# Patient Record
Sex: FEMALE | Race: WHITE | NOT HISPANIC OR LATINO | URBAN - METROPOLITAN AREA
[De-identification: names, ages, dates, MRNs, and addresses within clinical notes are randomized per-mention and may not be internally consistent; named-entity substitution may affect disease eponyms.]

---

## 2023-06-01 DIAGNOSIS — F32.A DEPRESSION, UNSPECIFIED DEPRESSION TYPE: Primary | ICD-10-CM

## 2023-06-01 NOTE — TELEPHONE ENCOUNTER
Patient is requesting a 90 day refill on her     Bupropion 300 mg    Sent to [facility] in [facility]    Thank You

## 2023-06-02 PROBLEM — F32.A DEPRESSION: Status: ACTIVE | Noted: 2023-06-02

## 2023-06-02 RX ORDER — BUPROPION HYDROCHLORIDE 300 MG/1
300 TABLET ORAL DAILY
COMMUNITY
End: 2023-06-02 | Stop reason: SDUPTHER

## 2023-06-02 RX ORDER — BUPROPION HYDROCHLORIDE 300 MG/1
300 TABLET ORAL DAILY
Qty: 30 TABLET | Refills: 0 | Status: SHIPPED | OUTPATIENT
Start: 2023-06-02 | End: 2023-06-21 | Stop reason: SDUPTHER

## 2023-06-02 NOTE — TELEPHONE ENCOUNTER
Requested Prescriptions     Pending Prescriptions Disp Refills    buPROPion XL (Wellbutrin XL) 300 mg 24 hr tablet 30 tablet 0     Sig: Take 1 tablet (300 mg) by mouth once daily.

## 2023-06-21 ENCOUNTER — OFFICE VISIT (OUTPATIENT)
Dept: PRIMARY CARE | Facility: CLINIC | Age: 68
End: 2023-06-21
Payer: MEDICARE

## 2023-06-21 VITALS
DIASTOLIC BLOOD PRESSURE: 105 MMHG | HEART RATE: 75 BPM | OXYGEN SATURATION: 90 % | BODY MASS INDEX: 39.65 KG/M2 | RESPIRATION RATE: 16 BRPM | WEIGHT: 231 LBS | SYSTOLIC BLOOD PRESSURE: 174 MMHG

## 2023-06-21 DIAGNOSIS — E66.9 CLASS 2 OBESITY WITHOUT SERIOUS COMORBIDITY WITH BODY MASS INDEX (BMI) OF 35.0 TO 35.9 IN ADULT, UNSPECIFIED OBESITY TYPE: ICD-10-CM

## 2023-06-21 DIAGNOSIS — E55.9 VITAMIN D DEFICIENCY: ICD-10-CM

## 2023-06-21 DIAGNOSIS — E53.8 VITAMIN B12 DEFICIENCY: ICD-10-CM

## 2023-06-21 DIAGNOSIS — F32.A DEPRESSION, UNSPECIFIED DEPRESSION TYPE: ICD-10-CM

## 2023-06-21 DIAGNOSIS — I10 HYPERTENSION, UNSPECIFIED TYPE: ICD-10-CM

## 2023-06-21 DIAGNOSIS — E78.5 HYPERLIPIDEMIA, UNSPECIFIED HYPERLIPIDEMIA TYPE: ICD-10-CM

## 2023-06-21 DIAGNOSIS — Z00.00 ROUTINE GENERAL MEDICAL EXAMINATION AT HEALTH CARE FACILITY: Primary | ICD-10-CM

## 2023-06-21 PROBLEM — E66.812 CLASS 2 OBESITY WITHOUT SERIOUS COMORBIDITY WITH BODY MASS INDEX (BMI) OF 35.0 TO 35.9 IN ADULT: Status: ACTIVE | Noted: 2023-06-21

## 2023-06-21 PROCEDURE — G0439 PPPS, SUBSEQ VISIT: HCPCS | Performed by: FAMILY MEDICINE

## 2023-06-21 RX ORDER — FUROSEMIDE 20 MG/1
20 TABLET ORAL DAILY
Qty: 90 TABLET | Refills: 1 | Status: SHIPPED | OUTPATIENT
Start: 2023-06-21 | End: 2023-12-18

## 2023-06-21 RX ORDER — TRIAMCINOLONE ACETONIDE 5 MG/G
CREAM TOPICAL 2 TIMES DAILY
COMMUNITY
Start: 2012-02-26

## 2023-06-21 RX ORDER — FUROSEMIDE 20 MG/1
20 TABLET ORAL DAILY
COMMUNITY
End: 2023-06-21 | Stop reason: SDUPTHER

## 2023-06-21 RX ORDER — METOPROLOL SUCCINATE 100 MG/1
100 TABLET, EXTENDED RELEASE ORAL DAILY
COMMUNITY
Start: 2023-03-16 | End: 2023-06-21 | Stop reason: SDUPTHER

## 2023-06-21 RX ORDER — PAROXETINE HYDROCHLORIDE 40 MG/1
40 TABLET, FILM COATED ORAL DAILY
COMMUNITY
End: 2023-06-21 | Stop reason: SDUPTHER

## 2023-06-21 RX ORDER — BUPROPION HYDROCHLORIDE 300 MG/1
300 TABLET ORAL DAILY
Qty: 90 TABLET | Refills: 1 | Status: SHIPPED | OUTPATIENT
Start: 2023-06-21 | End: 2023-06-30 | Stop reason: SDUPTHER

## 2023-06-21 RX ORDER — LISINOPRIL 40 MG/1
TABLET ORAL
COMMUNITY
Start: 2015-04-22 | End: 2023-06-21 | Stop reason: SDUPTHER

## 2023-06-21 RX ORDER — LISINOPRIL 40 MG/1
TABLET ORAL
Qty: 90 TABLET | Refills: 1 | Status: SHIPPED | OUTPATIENT
Start: 2023-06-21 | End: 2024-05-22 | Stop reason: SDUPTHER

## 2023-06-21 RX ORDER — PAROXETINE HYDROCHLORIDE 40 MG/1
40 TABLET, FILM COATED ORAL DAILY
Qty: 90 TABLET | Refills: 1 | Status: SHIPPED | OUTPATIENT
Start: 2023-06-21 | End: 2024-01-05 | Stop reason: SDUPTHER

## 2023-06-21 RX ORDER — METOPROLOL SUCCINATE 100 MG/1
100 TABLET, EXTENDED RELEASE ORAL DAILY
Qty: 90 TABLET | Refills: 1 | Status: SHIPPED | OUTPATIENT
Start: 2023-06-21 | End: 2024-01-05 | Stop reason: SDUPTHER

## 2023-06-21 ASSESSMENT — ENCOUNTER SYMPTOMS
LOSS OF SENSATION IN FEET: 0
DEPRESSION: 0
OCCASIONAL FEELINGS OF UNSTEADINESS: 0

## 2023-06-21 ASSESSMENT — PATIENT HEALTH QUESTIONNAIRE - PHQ9
2. FEELING DOWN, DEPRESSED OR HOPELESS: NOT AT ALL
1. LITTLE INTEREST OR PLEASURE IN DOING THINGS: NOT AT ALL
SUM OF ALL RESPONSES TO PHQ9 QUESTIONS 1 AND 2: 0
SUM OF ALL RESPONSES TO PHQ9 QUESTIONS 1 AND 2: 0
2. FEELING DOWN, DEPRESSED OR HOPELESS: NOT AT ALL
1. LITTLE INTEREST OR PLEASURE IN DOING THINGS: NOT AT ALL

## 2023-06-21 ASSESSMENT — ACTIVITIES OF DAILY LIVING (ADL)
TAKING_MEDICATION: INDEPENDENT
DRESSING: INDEPENDENT
MANAGING_FINANCES: INDEPENDENT
GROCERY_SHOPPING: INDEPENDENT
BATHING: INDEPENDENT
DOING_HOUSEWORK: INDEPENDENT

## 2023-06-21 NOTE — PROGRESS NOTES
Subjective   Reason for Visit: Ema Ulloa is an 68 y.o. female here for a Medicare Wellness visit.          HPI  Patient comes in today for Medicare wellness exam.  She is in town just a short period of time going back this Friday to [place], [place] to take care of her elderly mother who is ill.  She is currently without complaints.  She is taking all her medicines as directed without side effects.        12/15/2022  Patient is seen today as a telemedicine visit rendered via realtime interactive audio/video [employer] services as we are currently in the middle of a coronavirus pandemic worldwide. The patient was informed about the telehealth clinical encounter including benefits to avoiding travel and limitations to the assessment. In person care may be recommended if needed. Telehealth sessions are not being recorded and personal health information is protected.    Patient presents today from [facility] where she is about to have shoulder replacement surgery. By her history last year she has prediabetic. She is in need of refills of her medications. She has an appointment next month to see me in the office in person.    4/22/2022  Patient presents today for 6-month checkup and refill of meds. She states that she is taking her medicines as directed and is not having any side effects from them. She currently is complaining of pain in her left shoulder. She is also currently traveling back and forth between here in [place] taking care of her elderly parents.    9/29/2021  Patient is seen today as a telemedicine visit rendered via realtime interactive audio/video [employer] services as we are currently in the middle of a coronavirus pandemic worldwide. The patient was informed about the telehealth clinical encounter including benefits to avoiding travel and limitations to the assessment. In person care may be recommended if needed. Telehealth sessions are not being recorded and personal health  information is protected.    Patient presents today with symptoms of COVID-19. She went to [facility] last Sunday, 9/19/2021 and started coming down with symptoms on Wednesday and tested positive on Thursday, 9/23/2021. She has been vaccinated with the Pfizer vaccine. Since last week she has had significant uncontrollable diarrhea which has left her house a mess. She has no one to help her at home. She claims the diarrhea stopped a few days ago but she is requesting housekeeping help to clean up the diarrhea. She has no taste or smell but has a cough that lasts throughout the visit. She states the first 5 or 6 days she was running a fever anywhere from 99.4 to 102 degrees. She could not eat or drink. She states the last few days she has been drinking a lot of fluids. She states the worst symptom right now is the cough and lack of energy.     5/13/2021  Patient comes in today for Medicare wellness exam. She is retiring at the end of the month on 5/28/2021 and will be returning to [facility] to reside 3 out of the 4 weeks of each month to take care of her elderly mother who will be 91 this year. She is looking forward to it.    4/29/2021  Patient is seen today as a telemedicine visit rendered via realtime interactive audio/video [email or web address] services as we are currently in the middle of a coronavirus pandemic worldwide. The patient was informed about the telehealth clinical encounter including benefits to avoiding travel and limitations to the assessment. In person care may be recommended if needed. Telehealth sessions are not being recorded and personal health information is protected.    Patient presents today with upper respiratory symptoms. 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 She however has developed a cough again. She had one at the beginning of last month as well. She was treated with levofloxacin at that time and her cough to go away. She also is complaining of pressure  in her maxillary sinus region along with a headache. She has also had diarrhea for a week. She denies any other symptoms. She denies fever.    3/19/2021  Patient comes in today complaining of severe pain in her right shoulder. She was complaining of it during her last visit a week ago but it has gotten progressively worse. She tracks it back to about 7 to 10 days after getting the Covid vaccine in her right arm on 2/24/2021. She states that now it is so painful she has difficulty putting her bra on, wiping herself after having a bowel movement and other ADLs.     3/12/2021  Patient is seen today as a telemedicine visit rendered via realtime interactive audio/video [email or web address] services as we are currently in the middle of a coronavirus pandemic worldwide. The patient was informed about the telehealth clinical encounter including benefits to avoiding travel and limitations to the assessment. In person care may be recommended if needed. Telehealth sessions are not being recorded and personal health information is protected.    Patient presents today with a dry hacking cough and coughing spasms on and off for about a month now. She had the first dose of the Pfizer Covid vaccine on 2/24/2021 and is supposed to get the second 1 next Tuesday. She has had some body aches as well but she denies fever, sore throat, loss of smell or taste or GI symptoms.    Patient also complaining of some pain in her right shoulder. She claims her diabetes has been stable. She is overdue for lab work. Her leg has healed from her fracture last year.    1/26/2021  Patient comes in today for evaluation of UTI symptoms. She was having the symptoms which began Saturday night. She had an appointment with a teledoc who prescribed for her Septra DS for 5 days. She went to get the prescription that evening and the pharmacy was closed until 11 AM the next day. That night she had emesis all night long along with uncontrollable urination. Earlier in the week  she had diarrhea but that had gone away. She got the prescription after 11 AM on Sunday and got doses in twice a day since then. She is feeling a little bit better today. She has been slightly dizzy as well. 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 She is thinking about retiring soon.    11/19/2020  Patient presents today with a coarse productive cough. She denies fever or chills. She has had some dizziness. She had been working virtually from home until last week. Last week she was told to return to the school building and she was there the ninth through the 13th all week. Apparently some coworkers tested positive. She had a Covid test this past Sunday and got the results yesterday, it was negative.     7/17/2020  Patient presents today to get a note for her work place. 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 She tells me that work is available for her to do so. I explained they current  policy regarding work in the coronavirus pandemic.    7/7/2020  A telephone visit (audio only) between the patient (at the originating site) and the provider (at the distant site) was utilized to provide this telehealth service as we are currently under a coronavirus pandemic worldwide with stay-at-home orders by the government. Patient does not have video availability with phone or email.    Patient presents today for follow-up for left proximal tibia and medial femoral condyle fractures that occurred on 6/9/2020. She was visiting her parents in [place], [place] and was doing some yard work for them when she slipped on ivy and fell on concrete. She went to [facility] as they were right around the corner from where her parents live. He has had x-rays and an MRI of the left knee and has limited ambulation using a walker or  "cony. She has been in a knee immobilizer. She just got back into town a few days ago and was told to see orthopedics as soon as possible for follow-up. Will need to get her records from [place].    3/18/2020  Patient comes in today for follow-up on her cough. She completed her antibiotics last Friday however her cough persists and she is running a fever of 99.4°. As noted previously she does work in the school system which is currently shut down because of the coronavirus.    3/3/2020  Patient comes in today complaining of upper respiratory symptoms that began over the weekend. She has had a cough that is both dry and productive. She works in the school system and her school which shot down last week for 2 days to clean because of the number of absences. She is also complaining today of ear pain and sore throat. She is running a low-grade fever of 99°. She denies GI symptoms and did not get a flu shot this year.     8/29/19  Patient comes in today complaining of urinary tract symptoms. She is having urinary frequency, urgency and burning. She denies flank pain or other symptoms. Her UA today is completely negative except for small amount of protein.    7/15/2019   The patient is a 64 year old female who presents for a Recheck of Transition into care. The patient is transitioning into care from another physician (Pt is here as new pt to be established and for a refill of meds.) and a summary of care was not provided. Note for \"Transition into care\": Patient comes in today for her second visit to the practice. She returns for a physical exam.    She lives in [place]. She is originally from [place], [place]. Her mom and dad both 89 years old, alive and well still reside in [place]. She has one brother and 2 sisters. She is the oldest. She worked for the [employer] as a young adult and was transferred to the [place] area many years ago and has resided here since. She is currently working in the " school system as an . Her health insurance has changed a number of times over the years and seems to go back and forth between [facility] and . It just switched to .    Patient claims she has been pretty good health. She did break her left shoulder around 8 years ago and did not have to have surgery. It seemed to heal and did okay until now it's bothering her. It it is still bothering her today.    Patient has a flat erythematous skin lesion with an irregular border on the left upper biceps region which she states has been treated with a number of creams over the years without improvement. The lesion does seem to be improving with the cream I gave her last visit.    Patient had an exophytic lesion on the tip of her tongue that she had noticed for about 3 or 4 months. She denies trauma or injury to the tongue. It was removed recently by Dr. Gillette. We are awaiting pathology.    Patient has been battling her weight and would like to pursue something to help her lose weight.    Patient is on antidepressant medicine which she has been on for 25 years and also blood pressure medicine which she is been on for at least 20 years.    Patient Care Team:  Jose Nash DO as PCP - General  Jose Nash DO as PCP - [employer] PCP     Review of Systems   All other systems reviewed and are negative.      Objective   Vitals:  BP (!) 174/105   Pulse 75   Resp 16   Wt 105 kg (231 lb)   LMP  (LMP Unknown)   SpO2 90%   BMI 39.65 kg/m²       Physical Exam  Vitals reviewed.   Constitutional:       Appearance: She is well-developed. She is obese.   HENT:      Head: Normocephalic and atraumatic.      Right Ear: Tympanic membrane, ear canal and external ear normal.      Left Ear: Tympanic membrane, ear canal and external ear normal.      Nose: Nose normal.      Mouth/Throat:      Mouth: Mucous membranes are moist.      Pharynx: Oropharynx is clear.   Eyes:      Extraocular Movements:  Extraocular movements intact.      Conjunctiva/sclera: Conjunctivae normal.      Pupils: Pupils are equal, round, and reactive to light.   Cardiovascular:      Rate and Rhythm: Normal rate and regular rhythm.      Heart sounds: Normal heart sounds. No murmur heard.  Pulmonary:      Effort: Pulmonary effort is normal.      Breath sounds: Normal breath sounds. No wheezing.   Abdominal:      General: Abdomen is flat. Bowel sounds are normal.      Palpations: Abdomen is soft.   Musculoskeletal:         General: Normal range of motion.   Skin:     General: Skin is warm and dry.   Neurological:      General: No focal deficit present.      Mental Status: She is alert and oriented to person, place, and time. Mental status is at baseline.   Psychiatric:         Mood and Affect: Mood normal.         Behavior: Behavior normal.         Thought Content: Thought content normal.         Judgment: Judgment normal.         Assessment/Plan   Problem List Items Addressed This Visit       Depression    Relevant Medications    buPROPion XL (Wellbutrin XL) 300 mg 24 hr tablet    PARoxetine (Paxil) 40 mg tablet    Hypertension    Relevant Medications    metoprolol succinate XL (Toprol-XL) 100 mg 24 hr tablet    furosemide (Lasix) 20 mg tablet    lisinopril 40 mg tablet    Hyperlipidemia    Relevant Orders    Lipid Panel (Completed)    Vitamin D deficiency    Relevant Orders    Vitamin D, Total (Completed)    Vitamin B12 deficiency    Relevant Orders    CBC (Completed)    Vitamin B12 (Completed)    Class 2 obesity without serious comorbidity with body mass index (BMI) of 35.0 to 35.9 in adult    Relevant Medications    semaglutide, weight loss, 0.5 mg/0.5 mL pen injector    Routine general medical examination at health care facility - Primary    Relevant Orders    Comprehensive Metabolic Panel (Completed)   Will contact patient with lab results when available.  Continue current meds as directed.  Follow up in 6 months for recheck if all  remains stable, sooner if problems arise.  Medication list reconciled.  Thank you for visiting today!      Professional services: Some of this note was completed using Dragon voice technology and sometimes the software misinterprets words. This may include unintended errors with respect to translation of words, typographical errors or grammar errors which may not have been identified prior to finalization of the chart note. Please take this into account when reading the note. Thank you.

## 2023-06-21 NOTE — PROGRESS NOTES
Subjective   Reason for Visit: Eam Ulloa is an 68 y.o. female here for a Medicare Wellness visit.          Reviewed all medications by prescribing practitioner or clinical pharmacist (such as prescriptions, OTCs, herbal therapies and supplements) and documented in the medical record.    HPI    Patient Care Team:  Jose Nash DO as PCP - General  Jose Nash DO as PCP - [employer] PCP     Review of Systems    Objective   Vitals:  BP (!) 174/105   Pulse 75   Resp 16   Wt 105 kg (231 lb)   LMP  (LMP Unknown)   SpO2 90%   BMI 39.65 kg/m²       Physical Exam    Assessment/Plan   Problem List Items Addressed This Visit    None

## 2023-06-22 ENCOUNTER — LAB (OUTPATIENT)
Dept: LAB | Facility: LAB | Age: 68
End: 2023-06-22
Payer: MEDICARE

## 2023-06-22 DIAGNOSIS — E78.5 HYPERLIPIDEMIA, UNSPECIFIED HYPERLIPIDEMIA TYPE: ICD-10-CM

## 2023-06-22 DIAGNOSIS — Z00.00 ROUTINE GENERAL MEDICAL EXAMINATION AT HEALTH CARE FACILITY: ICD-10-CM

## 2023-06-22 DIAGNOSIS — E55.9 VITAMIN D DEFICIENCY: ICD-10-CM

## 2023-06-22 DIAGNOSIS — E53.8 VITAMIN B12 DEFICIENCY: ICD-10-CM

## 2023-06-22 LAB
ALANINE AMINOTRANSFERASE (SGPT) (U/L) IN SER/PLAS: 13 U/L (ref 7–45)
ALBUMIN (G/DL) IN SER/PLAS: 4 G/DL (ref 3.4–5)
ALKALINE PHOSPHATASE (U/L) IN SER/PLAS: 97 U/L (ref 33–136)
ANION GAP IN SER/PLAS: 13 MMOL/L (ref 10–20)
ASPARTATE AMINOTRANSFERASE (SGOT) (U/L) IN SER/PLAS: 15 U/L (ref 9–39)
BILIRUBIN TOTAL (MG/DL) IN SER/PLAS: 0.4 MG/DL (ref 0–1.2)
CALCIDIOL (25 OH VITAMIN D3) (NG/ML) IN SER/PLAS: 14 NG/ML
CALCIUM (MG/DL) IN SER/PLAS: 9.8 MG/DL (ref 8.6–10.3)
CARBON DIOXIDE, TOTAL (MMOL/L) IN SER/PLAS: 28 MMOL/L (ref 21–32)
CHLORIDE (MMOL/L) IN SER/PLAS: 106 MMOL/L (ref 98–107)
CHOLESTEROL (MG/DL) IN SER/PLAS: 206 MG/DL (ref 0–199)
CHOLESTEROL IN HDL (MG/DL) IN SER/PLAS: 49.4 MG/DL
CHOLESTEROL/HDL RATIO: 4.2
COBALAMIN (VITAMIN B12) (PG/ML) IN SER/PLAS: 273 PG/ML (ref 211–911)
CREATININE (MG/DL) IN SER/PLAS: 1.07 MG/DL (ref 0.5–1.05)
ERYTHROCYTE DISTRIBUTION WIDTH (RATIO) BY AUTOMATED COUNT: 12.6 % (ref 11.5–14.5)
ERYTHROCYTE MEAN CORPUSCULAR HEMOGLOBIN CONCENTRATION (G/DL) BY AUTOMATED: 32.7 G/DL (ref 32–36)
ERYTHROCYTE MEAN CORPUSCULAR VOLUME (FL) BY AUTOMATED COUNT: 92 FL (ref 80–100)
ERYTHROCYTES (10*6/UL) IN BLOOD BY AUTOMATED COUNT: 5.04 X10E12/L (ref 4–5.2)
GFR FEMALE: 56 ML/MIN/1.73M2
GLUCOSE (MG/DL) IN SER/PLAS: 123 MG/DL (ref 74–99)
HEMATOCRIT (%) IN BLOOD BY AUTOMATED COUNT: 46.5 % (ref 36–46)
HEMOGLOBIN (G/DL) IN BLOOD: 15.2 G/DL (ref 12–16)
LDL: 133 MG/DL (ref 0–99)
LEUKOCYTES (10*3/UL) IN BLOOD BY AUTOMATED COUNT: 11.1 X10E9/L (ref 4.4–11.3)
PLATELETS (10*3/UL) IN BLOOD AUTOMATED COUNT: 373 X10E9/L (ref 150–450)
POTASSIUM (MMOL/L) IN SER/PLAS: 4.9 MMOL/L (ref 3.5–5.3)
PROTEIN TOTAL: 6.3 G/DL (ref 6.4–8.2)
SODIUM (MMOL/L) IN SER/PLAS: 142 MMOL/L (ref 136–145)
TRIGLYCERIDE (MG/DL) IN SER/PLAS: 119 MG/DL (ref 0–149)
UREA NITROGEN (MG/DL) IN SER/PLAS: 31 MG/DL (ref 6–23)
VLDL: 24 MG/DL (ref 0–40)

## 2023-06-22 PROCEDURE — 85027 COMPLETE CBC AUTOMATED: CPT

## 2023-06-22 PROCEDURE — 36415 COLL VENOUS BLD VENIPUNCTURE: CPT

## 2023-06-22 PROCEDURE — 80061 LIPID PANEL: CPT

## 2023-06-22 PROCEDURE — 82607 VITAMIN B-12: CPT

## 2023-06-22 PROCEDURE — 82306 VITAMIN D 25 HYDROXY: CPT

## 2023-06-22 PROCEDURE — 83036 HEMOGLOBIN GLYCOSYLATED A1C: CPT

## 2023-06-22 PROCEDURE — 80053 COMPREHEN METABOLIC PANEL: CPT

## 2023-06-23 ENCOUNTER — TELEPHONE (OUTPATIENT)
Dept: PRIMARY CARE | Facility: CLINIC | Age: 68
End: 2023-06-23
Payer: MEDICARE

## 2023-06-23 DIAGNOSIS — R73.03 PREDIABETES: Primary | ICD-10-CM

## 2023-06-23 LAB
ESTIMATED AVERAGE GLUCOSE FOR HBA1C: 128 MG/DL
HEMOGLOBIN A1C/HEMOGLOBIN TOTAL IN BLOOD: 6.1 %

## 2023-06-23 NOTE — TELEPHONE ENCOUNTER
Patient would like to go over the results of her lab work with the doctor.  Patient can be reached at [phone].    Thank You

## 2023-06-28 DIAGNOSIS — R73.03 PREDIABETES: Primary | ICD-10-CM

## 2023-06-28 PROBLEM — R73.9 HYPERGLYCEMIA: Status: ACTIVE | Noted: 2023-06-28

## 2023-06-28 NOTE — TELEPHONE ENCOUNTER
Pt given labs. Wants to know if she can try ozempic since she got denied for wegovy. Please advise.

## 2023-06-30 DIAGNOSIS — E55.9 VITAMIN D DEFICIENCY: Primary | ICD-10-CM

## 2023-06-30 DIAGNOSIS — F32.A DEPRESSION, UNSPECIFIED DEPRESSION TYPE: ICD-10-CM

## 2023-06-30 RX ORDER — ERGOCALCIFEROL 1.25 MG/1
50000 CAPSULE ORAL
COMMUNITY
Start: 2019-07-21

## 2023-06-30 RX ORDER — BUPROPION HYDROCHLORIDE 300 MG/1
300 TABLET ORAL DAILY
Qty: 90 TABLET | Refills: 1 | Status: SHIPPED | OUTPATIENT
Start: 2023-06-30 | End: 2024-01-05 | Stop reason: SDUPTHER

## 2023-06-30 RX ORDER — ERGOCALCIFEROL 1.25 MG/1
50000 CAPSULE ORAL
Qty: 4 CAPSULE | Refills: 0 | Status: CANCELLED | OUTPATIENT
Start: 2023-06-30 | End: 2023-07-30

## 2023-06-30 RX ORDER — ERGOCALCIFEROL 1.25 MG/1
CAPSULE ORAL
Qty: 16 CAPSULE | Refills: 2 | Status: SHIPPED | OUTPATIENT
Start: 2023-06-30

## 2023-06-30 NOTE — TELEPHONE ENCOUNTER
Requested Prescriptions     Pending Prescriptions Disp Refills    buPROPion XL (Wellbutrin XL) 300 mg 24 hr tablet 90 tablet 1     Sig: Take 1 tablet (300 mg) by mouth once daily.    ergocalciferol (Vitamin D-2) 1.25 MG (61340 UT) capsule 4 capsule 0     Sig: Take 1 capsule (50,000 Units) by mouth 1 (one) time per week.

## 2023-06-30 NOTE — TELEPHONE ENCOUNTER
Patient is stating that the [facility] in [place] did not receive the prescription for her Wellbutrin or the Vitamin D.  If it can please be resent to the [facility] for her.  Patient can be reached at [phone].    Thank You

## 2023-07-21 ENCOUNTER — PATIENT OUTREACH (OUTPATIENT)
Dept: CARE COORDINATION | Facility: CLINIC | Age: 68
End: 2023-07-21
Payer: MEDICARE

## 2023-07-21 NOTE — PROGRESS NOTES
Left message on patient's phone with these details.    Marcus, My name is Cintia. I am the Patient Navigator with [facility].    I am following up from your recent visit with your PCP to make sure you do not have any further questions or need any further assistance.     I am here to help guide you through our healthcare system and help with any obstacles that are in the way of you receiving the proper care. I am able to assist with your appointments, medication coverage issues, community programs which can include help with meals, medical supplies, senior programs, housing and transportation issues.     We are here to help get you connected with the support you might need for your overall health. If you do need my assistance or have any questions please contact me at [phone]. This is my direct number and you can feel free to leave a message if I do not answer and I will call you back at my earliest convenience.     Contacting patient to Review [employer] Patient Experience Questionnaire.

## 2024-01-05 DIAGNOSIS — I10 HYPERTENSION, UNSPECIFIED TYPE: ICD-10-CM

## 2024-01-05 DIAGNOSIS — F32.A DEPRESSION, UNSPECIFIED DEPRESSION TYPE: ICD-10-CM

## 2024-01-05 RX ORDER — METOPROLOL SUCCINATE 100 MG/1
100 TABLET, EXTENDED RELEASE ORAL DAILY
Qty: 30 TABLET | Refills: 0 | Status: SHIPPED | OUTPATIENT
Start: 2024-01-05 | End: 2024-05-22 | Stop reason: SDUPTHER

## 2024-01-05 RX ORDER — PAROXETINE HYDROCHLORIDE 40 MG/1
40 TABLET, FILM COATED ORAL DAILY
Qty: 30 TABLET | Refills: 0 | Status: SHIPPED | OUTPATIENT
Start: 2024-01-05 | End: 2024-05-22 | Stop reason: SDUPTHER

## 2024-01-05 RX ORDER — BUPROPION HYDROCHLORIDE 300 MG/1
300 TABLET ORAL DAILY
Qty: 30 TABLET | Refills: 0 | Status: SHIPPED | OUTPATIENT
Start: 2024-01-05 | End: 2024-05-22 | Stop reason: SDUPTHER

## 2024-01-05 NOTE — TELEPHONE ENCOUNTER
Requested Prescriptions     Pending Prescriptions Disp Refills    buPROPion XL (Wellbutrin XL) 300 mg 24 hr tablet 30 tablet 0     Sig: Take 1 tablet (300 mg) by mouth once daily.    PARoxetine (Paxil) 40 mg tablet 30 tablet 0     Sig: Take 1 tablet (40 mg) by mouth once daily.    metoprolol succinate XL (Toprol-XL) 100 mg 24 hr tablet 30 tablet 0     Sig: Take 1 tablet (100 mg) by mouth once daily.     Please review and advise. Pt [place] her [place] out of state.

## 2024-01-05 NOTE — TELEPHONE ENCOUNTER
Patient called in requesting a refill on her       Wellbutrin 300 mg  Paxil 40 mg  Metoprolol 100 mg      Sent to [facility] in [place]      Thank You         Patient has an appointment on 1/10/24 with the doctor but she forgot her medication when she left [place] so she is completely out.  Patient can be reached at [phone].

## 2024-01-10 ENCOUNTER — LAB (OUTPATIENT)
Dept: LAB | Facility: LAB | Age: 69
End: 2024-01-10
Payer: MEDICARE

## 2024-01-10 ENCOUNTER — OFFICE VISIT (OUTPATIENT)
Dept: PRIMARY CARE | Facility: CLINIC | Age: 69
End: 2024-01-10
Payer: MEDICARE

## 2024-01-10 ENCOUNTER — TELEPHONE (OUTPATIENT)
Dept: PRIMARY CARE | Facility: CLINIC | Age: 69
End: 2024-01-10

## 2024-01-10 VITALS
DIASTOLIC BLOOD PRESSURE: 107 MMHG | HEART RATE: 65 BPM | BODY MASS INDEX: 40.63 KG/M2 | WEIGHT: 238 LBS | HEIGHT: 64 IN | RESPIRATION RATE: 20 BRPM | TEMPERATURE: 97.8 F | OXYGEN SATURATION: 91 % | SYSTOLIC BLOOD PRESSURE: 196 MMHG

## 2024-01-10 DIAGNOSIS — E53.8 VITAMIN B12 DEFICIENCY: ICD-10-CM

## 2024-01-10 DIAGNOSIS — Z01.818 PREOPERATIVE CLEARANCE: ICD-10-CM

## 2024-01-10 DIAGNOSIS — R73.03 PREDIABETES: ICD-10-CM

## 2024-01-10 DIAGNOSIS — E66.01 MORBID OBESITY WITH BMI OF 40.0-44.9, ADULT (MULTI): ICD-10-CM

## 2024-01-10 DIAGNOSIS — I10 BENIGN ESSENTIAL HYPERTENSION: ICD-10-CM

## 2024-01-10 DIAGNOSIS — R79.1 ABNORMAL COAGULATION PROFILE: ICD-10-CM

## 2024-01-10 DIAGNOSIS — E78.5 HYPERLIPIDEMIA, UNSPECIFIED HYPERLIPIDEMIA TYPE: ICD-10-CM

## 2024-01-10 DIAGNOSIS — E55.9 VITAMIN D DEFICIENCY: ICD-10-CM

## 2024-01-10 DIAGNOSIS — I10 HYPERTENSION, UNSPECIFIED TYPE: ICD-10-CM

## 2024-01-10 DIAGNOSIS — M25.512 CHRONIC LEFT SHOULDER PAIN: Primary | ICD-10-CM

## 2024-01-10 DIAGNOSIS — Z00.00 ENCOUNTER FOR HEALTH MAINTENANCE EXAMINATION: ICD-10-CM

## 2024-01-10 DIAGNOSIS — N18.31 STAGE 3A CHRONIC KIDNEY DISEASE (MULTI): ICD-10-CM

## 2024-01-10 DIAGNOSIS — G89.29 CHRONIC LEFT SHOULDER PAIN: Primary | ICD-10-CM

## 2024-01-10 LAB
25(OH)D3 SERPL-MCNC: 18 NG/ML (ref 30–100)
ANION GAP SERPL CALC-SCNC: 13 MMOL/L (ref 10–20)
APTT PPP: 34 SECONDS (ref 27–38)
BASOPHILS # BLD AUTO: 0.09 X10*3/UL (ref 0–0.1)
BASOPHILS NFR BLD AUTO: 0.8 %
BUN SERPL-MCNC: 28 MG/DL (ref 6–23)
CALCIUM SERPL-MCNC: 9.9 MG/DL (ref 8.6–10.3)
CHLORIDE SERPL-SCNC: 108 MMOL/L (ref 98–107)
CO2 SERPL-SCNC: 26 MMOL/L (ref 21–32)
CREAT SERPL-MCNC: 1.04 MG/DL (ref 0.5–1.05)
EGFRCR SERPLBLD CKD-EPI 2021: 58 ML/MIN/1.73M*2
EOSINOPHIL # BLD AUTO: 0.44 X10*3/UL (ref 0–0.7)
EOSINOPHIL NFR BLD AUTO: 3.8 %
ERYTHROCYTE [DISTWIDTH] IN BLOOD BY AUTOMATED COUNT: 13.1 % (ref 11.5–14.5)
EST. AVERAGE GLUCOSE BLD GHB EST-MCNC: 134 MG/DL
GLUCOSE SERPL-MCNC: 172 MG/DL (ref 74–99)
HBA1C MFR BLD: 6.3 %
HCT VFR BLD AUTO: 47 % (ref 36–46)
HGB BLD-MCNC: 15.2 G/DL (ref 12–16)
IMM GRANULOCYTES # BLD AUTO: 0.04 X10*3/UL (ref 0–0.7)
IMM GRANULOCYTES NFR BLD AUTO: 0.3 % (ref 0–0.9)
INR PPP: 1 (ref 0.9–1.1)
LYMPHOCYTES # BLD AUTO: 2.5 X10*3/UL (ref 1.2–4.8)
LYMPHOCYTES NFR BLD AUTO: 21.8 %
MCH RBC QN AUTO: 30.5 PG (ref 26–34)
MCHC RBC AUTO-ENTMCNC: 32.3 G/DL (ref 32–36)
MCV RBC AUTO: 94 FL (ref 80–100)
MONOCYTES # BLD AUTO: 0.62 X10*3/UL (ref 0.1–1)
MONOCYTES NFR BLD AUTO: 5.4 %
NEUTROPHILS # BLD AUTO: 7.78 X10*3/UL (ref 1.2–7.7)
NEUTROPHILS NFR BLD AUTO: 67.9 %
NRBC BLD-RTO: 0 /100 WBCS (ref 0–0)
PLATELET # BLD AUTO: 398 X10*3/UL (ref 150–450)
POC APPEARANCE, URINE: ABNORMAL
POC BILIRUBIN, URINE: NEGATIVE
POC BLOOD, URINE: NEGATIVE
POC COLOR, URINE: YELLOW
POC GLUCOSE, URINE: NEGATIVE MG/DL
POC KETONES, URINE: NEGATIVE MG/DL
POC LEUKOCYTES, URINE: NEGATIVE
POC NITRITE,URINE: NEGATIVE
POC PH, URINE: 5.5 PH
POC PROTEIN, URINE: ABNORMAL MG/DL
POC SPECIFIC GRAVITY, URINE: >=1.03
POC UROBILINOGEN, URINE: 0.2 EU/DL
POTASSIUM SERPL-SCNC: 4.7 MMOL/L (ref 3.5–5.3)
PROTHROMBIN TIME: 10.8 SECONDS (ref 9.8–12.8)
RBC # BLD AUTO: 4.99 X10*6/UL (ref 4–5.2)
SODIUM SERPL-SCNC: 142 MMOL/L (ref 136–145)
VIT B12 SERPL-MCNC: 252 PG/ML (ref 211–911)
WBC # BLD AUTO: 11.5 X10*3/UL (ref 4.4–11.3)

## 2024-01-10 PROCEDURE — 93000 ELECTROCARDIOGRAM COMPLETE: CPT | Performed by: FAMILY MEDICINE

## 2024-01-10 PROCEDURE — 85730 THROMBOPLASTIN TIME PARTIAL: CPT

## 2024-01-10 PROCEDURE — 36415 COLL VENOUS BLD VENIPUNCTURE: CPT

## 2024-01-10 PROCEDURE — 99397 PER PM REEVAL EST PAT 65+ YR: CPT | Performed by: FAMILY MEDICINE

## 2024-01-10 PROCEDURE — 83036 HEMOGLOBIN GLYCOSYLATED A1C: CPT

## 2024-01-10 PROCEDURE — 82306 VITAMIN D 25 HYDROXY: CPT

## 2024-01-10 PROCEDURE — 81003 URINALYSIS AUTO W/O SCOPE: CPT | Performed by: FAMILY MEDICINE

## 2024-01-10 PROCEDURE — 85025 COMPLETE CBC W/AUTO DIFF WBC: CPT

## 2024-01-10 PROCEDURE — 85610 PROTHROMBIN TIME: CPT

## 2024-01-10 PROCEDURE — 82607 VITAMIN B-12: CPT

## 2024-01-10 PROCEDURE — 80048 BASIC METABOLIC PNL TOTAL CA: CPT

## 2024-01-10 NOTE — TELEPHONE ENCOUNTER
Addressed with Pt. She mentioned she did not get her imaging done bc it was overcrowded in the department, she will go closer to her home.

## 2024-01-10 NOTE — TELEPHONE ENCOUNTER
Pt called upset because she stated that when she got her paperwork for when she left it stated her weight was 248, she said this was wrong and she looked at it when it was taken and it was at least 10 pounds over what she saw.    She would like a call to discuss this asap.

## 2024-01-10 NOTE — PROGRESS NOTES
"Subjective   Patient ID: Ema Ulloa is a 68 y.o. female who presents for Pre-op Clearance (L arm for arthirits pain. Getting this done in [place] and says the paperwork was faxed over about a week ago. ).    HPI   Patient comes in today for surgical clearance for upcoming left reverse total shoulder arthroplasty which is going to be done in [facility].  She needs preop labs, chest x-ray and EKG.  She is otherwise currently without complaints.    Review of Systems   All other systems reviewed and are negative.      Objective   BP (!) 196/107   Pulse 65   Temp 36.6 °C (97.8 °F)   Resp 20   Ht 1.626 m (5' 4\")   Wt 108 kg (238 lb)   LMP  (LMP Unknown)   SpO2 91%   BMI 40.85 kg/m²     Physical Exam  Constitutional:       Appearance: She is well-developed. She is morbidly obese.   HENT:      Head: Normocephalic and atraumatic.      Right Ear: Tympanic membrane, ear canal and external ear normal.      Left Ear: Tympanic membrane, ear canal and external ear normal.      Nose: Nose normal.      Mouth/Throat:      Mouth: Mucous membranes are moist.      Pharynx: Oropharynx is clear.   Eyes:      Extraocular Movements: Extraocular movements intact.      Conjunctiva/sclera: Conjunctivae normal.      Pupils: Pupils are equal, round, and reactive to light.   Cardiovascular:      Rate and Rhythm: Normal rate and regular rhythm.      Heart sounds: Normal heart sounds. No murmur heard.  Pulmonary:      Effort: Pulmonary effort is normal.      Breath sounds: Normal breath sounds. No wheezing.   Abdominal:      General: Abdomen is flat. Bowel sounds are normal.      Palpations: Abdomen is soft.   Musculoskeletal:         General: Tenderness (Left shoulder pain with decreased range of motion in abduction and internal and external rotation) present.   Skin:     General: Skin is warm and dry.   Neurological:      General: No focal deficit present.      Mental Status: She is alert and oriented to person, place, and " time. Mental status is at baseline.   Psychiatric:         Mood and Affect: Mood normal.         Behavior: Behavior normal.         Thought Content: Thought content normal.         Judgment: Judgment normal.         Assessment/Plan   Problem List Items Addressed This Visit             ICD-10-CM    Hypertension I10    Relevant Orders    Protime-INR (Completed)    APTT (Completed)    Hyperlipidemia E78.5    Vitamin D deficiency E55.9    Relevant Orders    Vitamin D 25-Hydroxy,Total (for eval of Vitamin D levels) (Completed)    Vitamin B12 deficiency E53.8    Relevant Orders    Vitamin B12 (Completed)    Stage 3a chronic kidney disease (CMS/[facility]) N18.31     Other Visit Diagnoses         Codes    Chronic left shoulder pain    -  Primary M25.512, G89.29    Morbid obesity with BMI of 40.0-44.9, adult (CMS/[facility])     E66.01, Z68.41    Encounter for health maintenance examination     Z00.00    Relevant Orders    ECG 12 Lead (Completed)    Benign essential hypertension     I10    Relevant Orders    ECG 12 Lead (Completed)    Preoperative clearance     Z01.818    Relevant Orders    XR chest 2 views    CBC and Auto Differential (Completed)    Basic Metabolic Panel (Completed)    POCT UA Automated manually resulted (Completed)    Abnormal coagulation profile     R79.1    Relevant Orders    Protime-INR (Completed)    APTT (Completed)        Will contact patient with lab results when available.  Will contact patient with x-ray results when they are available.  Patient will be cleared for surgery when all test results are back.  Medication list reconciled.  Thank you for visiting today!      Professional services: Some of this note was completed using Dragon voice technology and sometimes the software misinterprets words. This may include unintended errors with respect to translation of words, typographical errors or grammar errors which may not have been identified prior to finalization of the chart note. Please take this into  account when reading the note. Thank you.

## 2024-01-11 PROBLEM — N18.31 STAGE 3A CHRONIC KIDNEY DISEASE (MULTI): Status: ACTIVE | Noted: 2024-01-11

## 2024-01-12 DIAGNOSIS — E53.8 VITAMIN B12 DEFICIENCY: ICD-10-CM

## 2024-01-12 DIAGNOSIS — E55.9 VITAMIN D DEFICIENCY: Primary | ICD-10-CM

## 2024-01-12 RX ORDER — CHOLECALCIFEROL (VITAMIN D3) 125 MCG
CAPSULE ORAL
Qty: 100 TABLET | Refills: 1 | Status: SHIPPED | OUTPATIENT
Start: 2024-01-12

## 2024-01-12 RX ORDER — ERGOCALCIFEROL 1.25 MG/1
CAPSULE ORAL
Qty: 12 CAPSULE | Refills: 2 | Status: SHIPPED | OUTPATIENT
Start: 2024-01-12

## 2024-05-21 DIAGNOSIS — I10 HYPERTENSION, UNSPECIFIED TYPE: ICD-10-CM

## 2024-05-21 DIAGNOSIS — F32.A DEPRESSION, UNSPECIFIED DEPRESSION TYPE: ICD-10-CM

## 2024-05-21 NOTE — TELEPHONE ENCOUNTER
Rx Refill Request Telephone Encounter    Name:  Ema Ulloa  :  [id number]  Medication Name:    buPROPion XL (Wellbutrin XL) 300 mg 24 hr tablet   lisinopril 40 mg tablet   metoprolol succinate XL (Toprol-XL) 100 mg 24 hr tablet   PARoxetine (Paxil) 40 mg tablet       Specific Pharmacy location:    [id number] [address] AT [facility] [address] Phone: [phone]   Fax: [phone]        Date of last appointment:  01/10/24  Date of next appointment:  24  Best number to reach patient:  [phone]        Thank You

## 2024-05-22 NOTE — TELEPHONE ENCOUNTER
Requested Prescriptions     Pending Prescriptions Disp Refills    buPROPion XL (Wellbutrin XL) 300 mg 24 hr tablet 30 tablet 0     Sig: Take 1 tablet (300 mg) by mouth once daily.    metoprolol succinate XL (Toprol-XL) 100 mg 24 hr tablet 30 tablet 0     Sig: Take 1 tablet (100 mg) by mouth once daily.    PARoxetine (Paxil) 40 mg tablet 30 tablet 0     Sig: Take 1 tablet (40 mg) by mouth once daily.    lisinopril 40 mg tablet 30 tablet 0     Sig: TAKE 1 TABLET BY MOUTH ONE TIME DAILY

## 2024-05-28 RX ORDER — PAROXETINE HYDROCHLORIDE 40 MG/1
40 TABLET, FILM COATED ORAL DAILY
Qty: 30 TABLET | Refills: 3 | Status: SHIPPED | OUTPATIENT
Start: 2024-05-28 | End: 2024-09-25

## 2024-05-28 RX ORDER — BUPROPION HYDROCHLORIDE 300 MG/1
300 TABLET ORAL DAILY
Qty: 30 TABLET | Refills: 3 | Status: SHIPPED | OUTPATIENT
Start: 2024-05-28 | End: 2024-09-25

## 2024-05-28 RX ORDER — METOPROLOL SUCCINATE 100 MG/1
100 TABLET, EXTENDED RELEASE ORAL DAILY
Qty: 30 TABLET | Refills: 3 | Status: SHIPPED | OUTPATIENT
Start: 2024-05-28 | End: 2024-09-25

## 2024-05-28 RX ORDER — LISINOPRIL 40 MG/1
TABLET ORAL
Qty: 30 TABLET | Refills: 3 | Status: SHIPPED | OUTPATIENT
Start: 2024-05-28

## 2024-05-28 NOTE — TELEPHONE ENCOUNTER
Patient called in checking the status of these medications.  Patient can be reached at [phone].      Thank You

## 2024-06-20 ENCOUNTER — APPOINTMENT (OUTPATIENT)
Dept: PRIMARY CARE | Facility: CLINIC | Age: 69
End: 2024-06-20
Payer: MEDICARE

## 2024-06-20 ENCOUNTER — LAB (OUTPATIENT)
Dept: LAB | Facility: LAB | Age: 69
End: 2024-06-20
Payer: MEDICARE

## 2024-06-20 VITALS
TEMPERATURE: 97.9 F | DIASTOLIC BLOOD PRESSURE: 110 MMHG | SYSTOLIC BLOOD PRESSURE: 170 MMHG | BODY MASS INDEX: 39.99 KG/M2 | WEIGHT: 233 LBS | HEART RATE: 64 BPM | OXYGEN SATURATION: 94 % | RESPIRATION RATE: 16 BRPM

## 2024-06-20 DIAGNOSIS — E78.5 HYPERLIPIDEMIA, UNSPECIFIED HYPERLIPIDEMIA TYPE: ICD-10-CM

## 2024-06-20 DIAGNOSIS — I10 HYPERTENSION, UNSPECIFIED TYPE: ICD-10-CM

## 2024-06-20 DIAGNOSIS — E55.9 VITAMIN D DEFICIENCY: Primary | ICD-10-CM

## 2024-06-20 DIAGNOSIS — Z11.59 ENCOUNTER FOR HEPATITIS C SCREENING TEST FOR LOW RISK PATIENT: ICD-10-CM

## 2024-06-20 DIAGNOSIS — R73.9 HYPERGLYCEMIA: ICD-10-CM

## 2024-06-20 DIAGNOSIS — F32.A DEPRESSION, UNSPECIFIED DEPRESSION TYPE: ICD-10-CM

## 2024-06-20 DIAGNOSIS — E66.9 OBESITY (BMI 35.0-39.9 WITHOUT COMORBIDITY): ICD-10-CM

## 2024-06-20 DIAGNOSIS — E55.9 VITAMIN D DEFICIENCY: ICD-10-CM

## 2024-06-20 DIAGNOSIS — E53.8 VITAMIN B12 DEFICIENCY: ICD-10-CM

## 2024-06-20 DIAGNOSIS — Z00.00 ROUTINE GENERAL MEDICAL EXAMINATION AT HEALTH CARE FACILITY: ICD-10-CM

## 2024-06-20 DIAGNOSIS — E66.09 CLASS 2 OBESITY DUE TO EXCESS CALORIES WITHOUT SERIOUS COMORBIDITY WITH BODY MASS INDEX (BMI) OF 35.0 TO 35.9 IN ADULT: ICD-10-CM

## 2024-06-20 LAB
25(OH)D3 SERPL-MCNC: 33 NG/ML (ref 30–100)
ALBUMIN SERPL BCP-MCNC: 4.1 G/DL (ref 3.4–5)
ALP SERPL-CCNC: 106 U/L (ref 33–136)
ALT SERPL W P-5'-P-CCNC: 13 U/L (ref 7–45)
ANION GAP SERPL CALC-SCNC: 14 MMOL/L (ref 10–20)
AST SERPL W P-5'-P-CCNC: 14 U/L (ref 9–39)
BILIRUB SERPL-MCNC: 0.5 MG/DL (ref 0–1.2)
BUN SERPL-MCNC: 26 MG/DL (ref 6–23)
CALCIUM SERPL-MCNC: 9.9 MG/DL (ref 8.6–10.3)
CHLORIDE SERPL-SCNC: 107 MMOL/L (ref 98–107)
CHOLEST SERPL-MCNC: 200 MG/DL (ref 0–199)
CHOLESTEROL/HDL RATIO: 4.4
CO2 SERPL-SCNC: 25 MMOL/L (ref 21–32)
CREAT SERPL-MCNC: 0.98 MG/DL (ref 0.5–1.05)
EGFRCR SERPLBLD CKD-EPI 2021: 62 ML/MIN/1.73M*2
ERYTHROCYTE [DISTWIDTH] IN BLOOD BY AUTOMATED COUNT: 12.7 % (ref 11.5–14.5)
EST. AVERAGE GLUCOSE BLD GHB EST-MCNC: 134 MG/DL
GLUCOSE SERPL-MCNC: 145 MG/DL (ref 74–99)
HBA1C MFR BLD: 6.3 %
HCT VFR BLD AUTO: 46.9 % (ref 36–46)
HDLC SERPL-MCNC: 45.3 MG/DL
HGB BLD-MCNC: 15.3 G/DL (ref 12–16)
LDLC SERPL CALC-MCNC: 126 MG/DL
MCH RBC QN AUTO: 30.2 PG (ref 26–34)
MCHC RBC AUTO-ENTMCNC: 32.6 G/DL (ref 32–36)
MCV RBC AUTO: 93 FL (ref 80–100)
NON HDL CHOLESTEROL: 155 MG/DL (ref 0–149)
NRBC BLD-RTO: 0 /100 WBCS (ref 0–0)
PLATELET # BLD AUTO: 375 X10*3/UL (ref 150–450)
POTASSIUM SERPL-SCNC: 4.5 MMOL/L (ref 3.5–5.3)
PROT SERPL-MCNC: 6.3 G/DL (ref 6.4–8.2)
RBC # BLD AUTO: 5.06 X10*6/UL (ref 4–5.2)
SODIUM SERPL-SCNC: 141 MMOL/L (ref 136–145)
TRIGL SERPL-MCNC: 144 MG/DL (ref 0–149)
VIT B12 SERPL-MCNC: 291 PG/ML (ref 211–911)
VLDL: 29 MG/DL (ref 0–40)
WBC # BLD AUTO: 10.7 X10*3/UL (ref 4.4–11.3)

## 2024-06-20 PROCEDURE — 86803 HEPATITIS C AB TEST: CPT

## 2024-06-20 PROCEDURE — 36415 COLL VENOUS BLD VENIPUNCTURE: CPT

## 2024-06-20 PROCEDURE — 80053 COMPREHEN METABOLIC PANEL: CPT

## 2024-06-20 PROCEDURE — 85027 COMPLETE CBC AUTOMATED: CPT

## 2024-06-20 PROCEDURE — 82607 VITAMIN B-12: CPT

## 2024-06-20 PROCEDURE — 80061 LIPID PANEL: CPT

## 2024-06-20 PROCEDURE — 83036 HEMOGLOBIN GLYCOSYLATED A1C: CPT

## 2024-06-20 PROCEDURE — 99397 PER PM REEVAL EST PAT 65+ YR: CPT | Performed by: FAMILY MEDICINE

## 2024-06-20 PROCEDURE — G0439 PPPS, SUBSEQ VISIT: HCPCS | Performed by: FAMILY MEDICINE

## 2024-06-20 PROCEDURE — 99214 OFFICE O/P EST MOD 30 MIN: CPT | Performed by: FAMILY MEDICINE

## 2024-06-20 PROCEDURE — 82306 VITAMIN D 25 HYDROXY: CPT

## 2024-06-20 RX ORDER — LISINOPRIL 40 MG/1
TABLET ORAL
Qty: 90 TABLET | Refills: 1 | Status: SHIPPED | OUTPATIENT
Start: 2024-06-20

## 2024-06-20 RX ORDER — SEMAGLUTIDE 0.5 MG/.5ML
0.5 INJECTION, SOLUTION SUBCUTANEOUS
Qty: 2 ML | Refills: 0 | Status: SHIPPED | OUTPATIENT
Start: 2024-06-23 | End: 2024-07-15

## 2024-06-20 RX ORDER — AMLODIPINE BESYLATE 5 MG/1
5 TABLET ORAL DAILY
Qty: 30 TABLET | Refills: 5 | Status: SHIPPED | OUTPATIENT
Start: 2024-06-20 | End: 2024-12-17

## 2024-06-20 RX ORDER — BUPROPION HYDROCHLORIDE 450 MG/1
450 TABLET, FILM COATED, EXTENDED RELEASE ORAL DAILY
Qty: 90 TABLET | Refills: 1 | Status: SHIPPED | OUTPATIENT
Start: 2024-06-20 | End: 2024-12-17

## 2024-06-20 RX ORDER — METOPROLOL SUCCINATE 100 MG/1
100 TABLET, EXTENDED RELEASE ORAL DAILY
Qty: 90 TABLET | Refills: 1 | Status: SHIPPED | OUTPATIENT
Start: 2024-06-20 | End: 2024-12-17

## 2024-06-20 RX ORDER — PAROXETINE HYDROCHLORIDE 40 MG/1
40 TABLET, FILM COATED ORAL DAILY
Qty: 90 TABLET | Refills: 1 | Status: SHIPPED | OUTPATIENT
Start: 2024-06-20 | End: 2024-12-17

## 2024-06-20 ASSESSMENT — PATIENT HEALTH QUESTIONNAIRE - PHQ9
SUM OF ALL RESPONSES TO PHQ9 QUESTIONS 1 AND 2: 0
2. FEELING DOWN, DEPRESSED OR HOPELESS: NOT AT ALL
SUM OF ALL RESPONSES TO PHQ9 QUESTIONS 1 AND 2: 0
2. FEELING DOWN, DEPRESSED OR HOPELESS: NOT AT ALL
1. LITTLE INTEREST OR PLEASURE IN DOING THINGS: NOT AT ALL
1. LITTLE INTEREST OR PLEASURE IN DOING THINGS: NOT AT ALL

## 2024-06-20 ASSESSMENT — ACTIVITIES OF DAILY LIVING (ADL)
MANAGING_FINANCES: INDEPENDENT
BATHING: INDEPENDENT
DOING_HOUSEWORK: INDEPENDENT
GROCERY_SHOPPING: INDEPENDENT
DRESSING: INDEPENDENT
TAKING_MEDICATION: INDEPENDENT

## 2024-06-20 ASSESSMENT — ENCOUNTER SYMPTOMS
OCCASIONAL FEELINGS OF UNSTEADINESS: 0
DEPRESSION: 0
LOSS OF SENSATION IN FEET: 0

## 2024-06-20 NOTE — PROGRESS NOTES
"Subjective   Reason for Visit: Ema Ulloa is an 69 y.o. female here for a Medicare Wellness visit.     HPI  Patient comes in today for Medicare wellness exam.  She is here for just a few days and needs to go back to the [place] where she cares for her 94-year-old mother.  She is under a great deal of stress.  Apparently she has been informed by her sisters and brother that they are no longer interested in caring for her mom and providing respite for the patient from her mother's care.  This is providing a lot of stress and anxiety now for the patient as she does not want to put her mother in a nursing home.  Her siblings all however disagree.  Patient has having a lot of problems with anxiety and depression despite being on the Wellbutrin 300 mg a day and the Paxil 40 mg daily.  Patient's PHQ-9 score today is 11 which she describes as somewhat difficult.    Patient states that she feels dizzy a lot.  She is severely obese and did 2 weeks ago start on the Atkins diet and states she always feels better when doing this kind of diet.  We attempted to get her Ozempic during her last visit but her insurance did not approve.  Patient would like to try the Wegovy this time.    Patient also states she has having problems with back spasms and numbness in her legs.  She states that she sometimes feels the \"bones in my buttocks\" which she notices especially when she is driving between [facility] and [place].    Her blood pressure is high today and she claims that she has been taking all of her medication as directed.  Her heart rate is in the lower 60s.  She is already maxed out on lisinopril 40 mg daily and is taking metoprolol succinate 100 mg daily.  We will need to add a new medicine.      Patient does not believe in immunizations and refuses mammograms, DEXA and Cologuard or colonoscopy.    Patient Care Team:  Jose Nash DO as PCP - General  Jose Nash, DO as PCP - [employer] PCP     Review " of Systems   All other systems reviewed and are negative.      Objective   Vitals:  BP (!) 170/110   Pulse 64   Temp 36.6 °C (97.9 °F)   Resp 16   Wt 106 kg (233 lb)   LMP  (LMP Unknown)   SpO2 94%   BMI 39.99 kg/m²       Physical Exam  Vitals reviewed.   Constitutional:       Appearance: She is well-developed.   HENT:      Head: Normocephalic and atraumatic.      Right Ear: Tympanic membrane, ear canal and external ear normal.      Left Ear: Tympanic membrane, ear canal and external ear normal.      Nose: Nose normal.      Mouth/Throat:      Mouth: Mucous membranes are moist.      Pharynx: Oropharynx is clear.   Eyes:      Extraocular Movements: Extraocular movements intact.      Conjunctiva/sclera: Conjunctivae normal.      Pupils: Pupils are equal, round, and reactive to light.   Cardiovascular:      Rate and Rhythm: Normal rate and regular rhythm.      Heart sounds: Normal heart sounds. No murmur heard.  Pulmonary:      Effort: Pulmonary effort is normal.      Breath sounds: Normal breath sounds. No wheezing.   Abdominal:      General: Abdomen is flat. Bowel sounds are normal.      Palpations: Abdomen is soft.   Musculoskeletal:         General: Normal range of motion.   Skin:     General: Skin is warm and dry.   Neurological:      General: No focal deficit present.      Mental Status: She is alert and oriented to person, place, and time. Mental status is at baseline.   Psychiatric:         Mood and Affect: Mood normal.         Behavior: Behavior normal.         Thought Content: Thought content normal.         Judgment: Judgment normal.       Assessment/Plan   Problem List Items Addressed This Visit       Depression    Overview     Increase Wellbutrin to 450 mg daily  Continue paroxetine 40 mg daily         Relevant Medications    buPROPion XL (Forfivo XL) 450 mg 24 hr tablet    PARoxetine (Paxil) 40 mg tablet    Hypertension    Overview     Continue lisinopril 40 mg daily  Continue metoprolol succinate  100 mg daily  Add amlodipine 5 mg daily 6/20/2024  Recheck again in a month         Relevant Medications    lisinopril 40 mg tablet    metoprolol succinate XL (Toprol-XL) 100 mg 24 hr tablet    amLODIPine (Norvasc) 5 mg tablet    Other Relevant Orders    Comprehensive Metabolic Panel    Hyperlipidemia    Relevant Orders    Lipid Panel    Vitamin D deficiency - Primary    Relevant Orders    Vitamin D 25-Hydroxy,Total (for eval of Vitamin D levels)    Vitamin B12 deficiency    Relevant Orders    CBC    Vitamin B12    Class 2 obesity without serious comorbidity with body mass index (BMI) of 35.0 to 35.9 in adult    Overview     Start Wegovy 0.5 mg weekly         Routine general medical examination at health care facility    Hyperglycemia    Relevant Orders    Hemoglobin A1C     Other Visit Diagnoses       Obesity (BMI 35.0-39.9 without comorbidity)        Relevant Medications    semaglutide, weight loss, (Wegovy) 0.5 mg/0.5 mL pen injector (Start on 6/23/2024)        Take new medication as directed.  Take new dose of Wellbutrin medication as directed  Continue other medications as directed.  RTC in one month for recheck, sooner should problems arise.  Will contact patient with lab results when available.  Medication list reconciled.  Thank you for visiting today!      Professional services: Some of this note was completed using Dragon voice technology and sometimes the software misinterprets words. This may include unintended errors with respect to translation of words, typographical errors or grammar errors which may not have been identified prior to finalization of the chart note. Please take this into account when reading the note. Thank you.

## 2024-06-21 LAB — HCV AB SER QL: NONREACTIVE

## 2024-06-21 NOTE — RESULT ENCOUNTER NOTE
Luis Boo,    You remain in the prediabetic range with your sugar at 6.3.  Continue to watch sugar, carbs and starches in your diet.    Your protein level was a little low at 6.3.  Make sure you are eating enough protein in your diet.    Your vitamin D level was low normal at 33.  It should be between 30 and 100 the closer to 50 the better. It is an important vitamin for your heart, lungs, immune system and bones among other things in your body. Would recommend over the counter vitamin D3 2000 units daily to get it into and keep it in the normal range and recheck in October 2024.    Your B12 level was low normal at 291.  It should be above 400, when less than 400 one may have both neurological and/or psychological symptoms. Would recommend B-12 shots monthly for 4 months and recheck in October 2024. These can be self injected at home or set up in our office as a nurse visit. Let me know which you would like to do.    The rest of your labs were good.    Have a Great Day and Weekend!!!    Dr. Nash

## 2024-10-11 DIAGNOSIS — Z12.31 ENCOUNTER FOR SCREENING MAMMOGRAM FOR BREAST CANCER: ICD-10-CM

## 2024-10-17 ENCOUNTER — TELEPHONE (OUTPATIENT)
Dept: PRIMARY CARE | Facility: CLINIC | Age: 69
End: 2024-10-17
Payer: MEDICARE

## 2024-10-17 NOTE — TELEPHONE ENCOUNTER
Can you please call the patient and explain that she should have a refill left on her Buropion and she keeps stating that she does not.  Patient can be reached at [phone].        Thank You

## 2024-10-17 NOTE — TELEPHONE ENCOUNTER
Spoke with [facility] pharmacist. They do have the 450 mg. Pt was unaware her dose was increased at her visit in June and thought she was still on 300, hence the confusion. Pharmacy has 90 day and remaining refill for pt and was advised to get one ready for pt. Pt notified.

## 2024-10-18 DIAGNOSIS — F32.A DEPRESSION, UNSPECIFIED DEPRESSION TYPE: ICD-10-CM

## 2024-10-18 RX ORDER — BUPROPION HYDROCHLORIDE 300 MG/1
300 TABLET ORAL DAILY
Qty: 90 TABLET | Refills: 1 | Status: SHIPPED | OUTPATIENT
Start: 2024-10-18 | End: 2025-04-16

## 2024-10-18 NOTE — TELEPHONE ENCOUNTER
Patient called in this morning stating that the insurance will not cover the 450 mg of the medication and she is asking to just have the 300 sent to the pharmacy for her.  Patient can be reached at [phone].        Thank You

## 2024-10-18 NOTE — TELEPHONE ENCOUNTER
Please notify patient that the 300 mg pill was sent to [facility] in [place], [place].  Thank you.

## 2024-12-23 PROBLEM — Z53.20 MAMMOGRAM DECLINED: Status: ACTIVE | Noted: 2024-06-20

## 2024-12-23 PROBLEM — Z53.20 COLON CANCER SCREENING DECLINED: Status: ACTIVE | Noted: 2024-06-20

## 2025-02-10 ENCOUNTER — APPOINTMENT (OUTPATIENT)
Dept: PRIMARY CARE | Facility: CLINIC | Age: 70
End: 2025-02-10
Payer: MEDICARE

## 2025-02-10 VITALS
HEIGHT: 64 IN | HEART RATE: 72 BPM | RESPIRATION RATE: 20 BRPM | TEMPERATURE: 97.6 F | BODY MASS INDEX: 40.12 KG/M2 | WEIGHT: 235 LBS | SYSTOLIC BLOOD PRESSURE: 151 MMHG | DIASTOLIC BLOOD PRESSURE: 84 MMHG | OXYGEN SATURATION: 93 %

## 2025-02-10 DIAGNOSIS — E66.01 MORBID OBESITY WITH BMI OF 40.0-44.9, ADULT (MULTI): ICD-10-CM

## 2025-02-10 DIAGNOSIS — N18.31 STAGE 3A CHRONIC KIDNEY DISEASE (MULTI): ICD-10-CM

## 2025-02-10 DIAGNOSIS — I10 HYPERTENSION, ESSENTIAL, BENIGN: ICD-10-CM

## 2025-02-10 DIAGNOSIS — K92.1 BLACK STOOLS: Primary | ICD-10-CM

## 2025-02-10 DIAGNOSIS — F32.A DEPRESSION, UNSPECIFIED DEPRESSION TYPE: ICD-10-CM

## 2025-02-10 PROCEDURE — 3079F DIAST BP 80-89 MM HG: CPT | Performed by: FAMILY MEDICINE

## 2025-02-10 PROCEDURE — 1159F MED LIST DOCD IN RCRD: CPT | Performed by: FAMILY MEDICINE

## 2025-02-10 PROCEDURE — 99214 OFFICE O/P EST MOD 30 MIN: CPT | Performed by: FAMILY MEDICINE

## 2025-02-10 PROCEDURE — 3008F BODY MASS INDEX DOCD: CPT | Performed by: FAMILY MEDICINE

## 2025-02-10 PROCEDURE — 1123F ACP DISCUSS/DSCN MKR DOCD: CPT | Performed by: FAMILY MEDICINE

## 2025-02-10 PROCEDURE — 3077F SYST BP >= 140 MM HG: CPT | Performed by: FAMILY MEDICINE

## 2025-02-10 RX ORDER — OMEPRAZOLE 40 MG/1
40 CAPSULE, DELAYED RELEASE ORAL
Qty: 60 CAPSULE | Refills: 1 | Status: SHIPPED | OUTPATIENT
Start: 2025-02-10 | End: 2025-08-09

## 2025-02-10 NOTE — PROGRESS NOTES
"Subjective   Patient ID: Ema Ulloa is a 69 y.o. female who presents for Black or Bloody Stool (Started on Thursday. No pain. She has had \"weird poop\" for a long time. She has never had a colonoscopy. She was told before to was because she had been taking too much tylenol but she has cut back. ).    HPI  Patient comes in today, she is just in town until Thursday.  She is here today with a sample of black stool.  She states the black stool started last Thursday.  She denies use of Pepto-Bismol.  She states she has noticed some discomfort in her mid epigastric region.  She has never had a colonoscopy nor has she had an EGD or a Cologuard test.  She is back and forth between here in [facility] where she cares for her 94-year-old mother.    6/20/2024  Patient comes in today for Medicare wellness exam.  She is here for just a few days and needs to go back to the [place] where she cares for her 94-year-old mother.  She is under a great deal of stress.  Apparently she has been informed by her sisters and brother that they are no longer interested in caring for her mom and providing respite for the patient from her mother's care.  This is providing a lot of stress and anxiety now for the patient as she does not want to put her mother in a nursing home.  Her siblings all however disagree.  Patient has having a lot of problems with anxiety and depression despite being on the Wellbutrin 300 mg a day and the Paxil 40 mg daily.  Patient's PHQ-9 score today is 11 which she describes as somewhat difficult.    Patient states that she feels dizzy a lot.  She is severely obese and did 2 weeks ago start on the Atkins diet and states she always feels better when doing this kind of diet.  We attempted to get her Ozempic during her last visit but her insurance did not approve.  Patient would like to try the Wegovy this time.    Patient also states she has having problems with back spasms and numbness in her legs.  She " "states that she sometimes feels the \"bones in my buttocks\" which she notices especially when she is driving between [facility] and [place].    Her blood pressure is high today and she claims that she has been taking all of her medication as directed.  Her heart rate is in the lower 60s.  She is already maxed out on lisinopril 40 mg daily and is taking metoprolol succinate 100 mg daily.  We will need to add a new medicine.      Patient does not believe in immunizations and refuses mammograms, DEXA and Cologuard or colonoscopy.    1/10/2024  Patient comes in today for surgical clearance for upcoming left reverse total shoulder arthroplasty which is going to be done in [facility].  She needs preop labs, chest x-ray and EKG.  She is otherwise currently without complaints.    6/21/2023  Patient comes in today for Medicare wellness exam.  She is in town just a short period of time going back this Friday to [place], [place] to take care of her elderly mother who is ill.  She is currently without complaints.  She is taking all her medicines as directed without side effects.    12/15/2022  Patient is seen today as a telemedicine visit rendered via realtime interactive audio/video [employer] services as we are currently in the middle of a coronavirus pandemic worldwide. The patient was informed about the telehealth clinical encounter including benefits to avoiding travel and limitations to the assessment. In person care may be recommended if needed. Telehealth sessions are not being recorded and personal health information is protected.    Patient presents today from [facility] where she is about to have shoulder replacement surgery. By her history last year she has prediabetic. She is in need of refills of her medications. She has an appointment next month to see me in the office in person.    4/22/2022  Patient presents today for 6-month checkup and refill of meds. She states that she is taking her medicines " as directed and is not having any side effects from them. She currently is complaining of pain in her left shoulder. She is also currently traveling back and forth between here in [place] taking care of her elderly parents.    9/29/2021  Patient is seen today as a telemedicine visit rendered via realtime interactive audio/video [facility] services as we are currently in the middle of a coronavirus pandemic worldwide. The patient was informed about the telehealth clinical encounter including benefits to avoiding travel and limitations to the assessment. In person care may be recommended if needed. Telehealth sessions are not being recorded and personal health information is protected.    Patient presents today with symptoms of COVID-19. She went to [facility] last Sunday, 9/19/2021 and started coming down with symptoms on Wednesday and tested positive on Thursday, 9/23/2021. She has been vaccinated with the Pfizer vaccine. Since last week she has had significant uncontrollable diarrhea which has left her house a mess. She has no one to help her at home. She claims the diarrhea stopped a few days ago but she is requesting housekeeping help to clean up the diarrhea. She has no taste or smell but has a cough that lasts throughout the visit. She states the first 5 or 6 days she was running a fever anywhere from 99.4 to 102 degrees. She could not eat or drink. She states the last few days she has been drinking a lot of fluids. She states the worst symptom right now is the cough and lack of energy.     5/13/2021  Patient comes in today for Medicare wellness exam. She is retiring at the end of the month on 5/28/2021 and will be returning to [facility] to reside 3 out of the 4 weeks of each month to take care of her elderly mother who will be 91 this year. She is looking forward to it.    4/29/2021  Patient is seen today as a telemedicine visit rendered via realtime interactive audio/video [email or web address] services  as we are currently in the middle of a coronavirus pandemic worldwide. The patient was informed about the telehealth clinical encounter including benefits to avoiding travel and limitations to the assessment. In person care may be recommended if needed. Telehealth sessions are not being recorded and personal health information is protected.    Patient presents today with upper respiratory symptoms. 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 She however has developed a cough again. She had one at the beginning of last month as well. She was treated with levofloxacin at that time and her cough to go away. She also is complaining of pressure in her maxillary sinus region along with a headache. She has also had diarrhea for a week. She denies any other symptoms. She denies fever.    3/19/2021  Patient comes in today complaining of severe pain in her right shoulder. She was complaining of it during her last visit a week ago but it has gotten progressively worse. She tracks it back to about 7 to 10 days after getting the Covid vaccine in her right arm on 2/24/2021. She states that now it is so painful she has difficulty putting her bra on, wiping herself after having a bowel movement and other ADLs.     3/12/2021  Patient is seen today as a telemedicine visit rendered via realtime interactive audio/video [email or web address] services as we are currently in the middle of a coronavirus pandemic worldwide. The patient was informed about the telehealth clinical encounter including benefits to avoiding travel and limitations to the assessment. In person care may be recommended if needed. Telehealth sessions are not being recorded and personal health information is protected.    Patient presents today with a dry hacking cough and coughing spasms on and off for about a month now. She had the first dose of the Pfizer Covid vaccine on 2/24/2021 and is supposed to get the second 1 next Tuesday. She has had some body aches  as well but she denies fever, sore throat, loss of smell or taste or GI symptoms.    Patient also complaining of some pain in her right shoulder. She claims her diabetes has been stable. She is overdue for lab work. Her leg has healed from her fracture last year.    1/26/2021  Patient comes in today for evaluation of UTI symptoms. She was having the symptoms which began Saturday night. She had an appointment with a teledoc who prescribed for her Septra DS for 5 days. She went to get the prescription that evening and the pharmacy was closed until 11 AM the next day. That night she had emesis all night long along with uncontrollable urination. Earlier in the week she had diarrhea but that had gone away. She got the prescription after 11 AM on Sunday and got doses in twice a day since then. She is feeling a little bit better today. She has been slightly dizzy as well. 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 She is thinking about retiring soon.    11/19/2020  Patient presents today with a coarse productive cough. She denies fever or chills. She has had some dizziness. She had been working virtually from home until last week. Last week she was told to return to the school building and she was there the ninth through the 13th all week. Apparently some coworkers tested positive. She had a Covid test this past Sunday and got the results yesterday, it was negative.     7/17/2020  Patient presents today to get a note for her work place. 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 She tells me that work is available for her to do so. I explained they current  policy regarding work in the coronavirus pandemic.    7/7/2020  A telephone visit (audio only) between the patient (at the originating site) and the provider (at the distant site)  was utilized to provide this telehealth service as we are currently under a coronavirus pandemic worldwide with stay-at-home orders by the government. Patient does not have video availability with phone or email.    Patient presents today for follow-up for left proximal tibia and medial femoral condyle fractures that occurred on 6/9/2020. She was visiting her parents in [place], [place] and was doing some yard work for them when she slipped on ivy and fell on concrete. She went to [facility] as they were right around the corner from where her parents live. He has had x-rays and an MRI of the left knee and has limited ambulation using a walker or cane. She has been in a knee immobilizer. She just got back into town a few days ago and was told to see orthopedics as soon as possible for follow-up. Will need to get her records from [facility].    3/18/2020  Patient comes in today for follow-up on her cough. She completed her antibiotics last Friday however her cough persists and she is running a fever of 99.4°. As noted previously she does work in the school system which is currently shut down because of the coronavirus.    3/3/2020  Patient comes in today complaining of upper respiratory symptoms that began over the weekend. She has had a cough that is both dry and productive. She works in the school system and her school which shot down last week for 2 days to clean because of the number of absences. She is also complaining today of ear pain and sore throat. She is running a low-grade fever of 99°. She denies GI symptoms and did not get a flu shot this year.     8/29/19  Patient comes in today complaining of urinary tract symptoms. She is having urinary frequency, urgency and burning. She denies flank pain or other symptoms. Her UA today is completely negative except for small amount of protein.    7/15/2019   The patient is a 64 year old female who presents for a Recheck of Transition into care. The  "patient is transitioning into care from another physician (Pt is here as new pt to be established and for a refill of meds.) and a summary of care was not provided. Note for \"Transition into care\": Patient comes in today for her second visit to the practice. She returns for a physical exam.    She lives in [place]. She is originally from [place], [place]. Her mom and dad both 89 years old, alive and well still reside in [place]. She has one brother and 2 sisters. She is the oldest. She worked for the [employer] as a young adult and was transferred to the [place] area many years ago and has resided here since. She is currently working in the school system as an . Her health insurance has changed a number of times over the years and seems to go back and forth between [facility] and . It just switched to .    Patient claims she has been pretty good health. She did break her left shoulder around 8 years ago and did not have to have surgery. It seemed to heal and did okay until now it's bothering her. It it is still bothering her today.    Patient has a flat erythematous skin lesion with an irregular border on the left upper biceps region which she states has been treated with a number of creams over the years without improvement. The lesion does seem to be improving with the cream I gave her last visit.    Patient had an exophytic lesion on the tip of her tongue that she had noticed for about 3 or 4 months. She denies trauma or injury to the tongue. It was removed recently by Dr. Gillette. We are awaiting pathology.    Patient has been battling her weight and would like to pursue something to help her lose weight.    Patient is on antidepressant medicine which she has been on for 25 years and also blood pressure medicine which she is been on for at least 20 years.    Review of Systems   All other systems reviewed and are negative.      Objective   Vitals:  /84   Pulse 72   Temp " "36.4 °C (97.6 °F)   Resp 20   Ht 1.626 m (5' 4\")   Wt 107 kg (235 lb)   LMP  (LMP Unknown)   SpO2 93%   BMI 40.34 kg/m²     Physical Exam  Vitals reviewed.   Constitutional:       Appearance: She is well-developed.   HENT:      Head: Normocephalic and atraumatic.      Right Ear: Tympanic membrane, ear canal and external ear normal.      Left Ear: Tympanic membrane, ear canal and external ear normal.      Nose: Nose normal.      Mouth/Throat:      Mouth: Mucous membranes are moist.      Pharynx: Oropharynx is clear.   Eyes:      Extraocular Movements: Extraocular movements intact.      Conjunctiva/sclera: Conjunctivae normal.      Pupils: Pupils are equal, round, and reactive to light.   Cardiovascular:      Rate and Rhythm: Normal rate and regular rhythm.      Heart sounds: Normal heart sounds. No murmur heard.  Pulmonary:      Effort: Pulmonary effort is normal.      Breath sounds: Normal breath sounds. No wheezing.   Abdominal:      General: Abdomen is flat. Bowel sounds are normal.      Palpations: Abdomen is soft.      Tenderness: There is abdominal tenderness (Midepigastric tenderness to palpation).   Musculoskeletal:         General: Normal range of motion.   Skin:     General: Skin is warm and dry.   Neurological:      General: No focal deficit present.      Mental Status: She is alert and oriented to person, place, and time. Mental status is at baseline.   Psychiatric:         Mood and Affect: Mood normal.         Behavior: Behavior normal.         Thought Content: Thought content normal.         Judgment: Judgment normal.       Assessment/Plan   Problem List Items Addressed This Visit       Depression    Overview     Continue wellbutrin 450 mg daily  Continue paroxetine 40 mg daily         Hypertension, essential, benign    Overview     Continue lisinopril 40 mg daily  Continue metoprolol succinate 100 mg daily  Continue amlodipine 5 mg daily 6/20/2024           Stage 3a chronic kidney disease (Multi) "    Black stools - Primary    Relevant Medications    omeprazole (PriLOSEC) 40 mg DR capsule    Morbid obesity with BMI of 40.0-44.9, adult (Multi)    Relevant Orders    Referral to Clinical Pharmacy   Take new medication as directed.  Continue other medications as directed.  RTC in one month for recheck, sooner should problems arise.  Will consult clinical pharmacy for weight management.  Medication list reconciled.  Thank you for visiting today!      Professional services: Some of this note was completed using Dragon voice technology and sometimes the software misinterprets words. This may include unintended errors with respect to translation of words, typographical errors or grammar errors which may not have been identified prior to finalization of the chart note. Please take this into account when reading the note. Thank you.       Jose Nash DO 02/10/25 12:36 PM

## 2025-02-17 ENCOUNTER — APPOINTMENT (OUTPATIENT)
Dept: PHARMACY | Facility: HOSPITAL | Age: 70
End: 2025-02-17
Payer: MEDICARE

## 2025-02-17 DIAGNOSIS — E66.01 MORBID OBESITY WITH BMI OF 40.0-44.9, ADULT (MULTI): ICD-10-CM

## 2025-02-17 NOTE — PROGRESS NOTES
Clinical Pharmacy Appointment    Patient ID: Ema Ulloa is a 69 y.o. female who presents for No chief complaint on file..    Pt is here for First appointment.     Referring Provider: Jose Nash DO  PCP: Jose Nash DO   Last visit with PCP: 2/10/25   Next visit with PCP: ***    Subjective     WEIGHT LOSS  BMI Readings from Last 3 Encounters:   02/10/25 40.34 kg/m²   06/20/24 39.99 kg/m²   01/10/24 40.85 kg/m²      Starting weight: *** lbs. (***)  Current weight: *** lbs.    Goal weight    Lifestyle  Diet: *** meals/day. Has tried atkins diet  BK: ***  LN: ***  DN: ***  Snacks: ***  Drinks: ***  Has worked with nutritionist/dietician? {Yes/No:96484}  Has worked with weight management? {Yes/No:73812}  Exercise: {exercise:58110}  Activity type: {Type of Exercise (Optional):74058}  Is limited by: {Exercise limitations:82214}    Other Potential Contributing Factors  Alcohol use: Average *** drinks/week  Tobacco use: {YES wildcard/NO:60}  Other drug use: {YES wildcard/NO:60}  Medications that may cause weight gain: {meds:07564}  Mental health: {Mental Health Status:36524}  Eating Disorders: {Eating Disorder Hx:47458}  Comorbidities: Comorbidities: back pain{back pain (Optional):20831}, depressed mood{depressed mood (Optional):48773}, high cholesterol, last Hgb A1c: 6.3%, and hypertension controlled with oral meds    Pertinent PMH Review:  PMH of Pancreatitis: {YES,NO:73567}  PMH of Retinopathy: {YES,NO:05834}  PMH of MTC: {YES,NO:33734}  PMH of MEN2: {YES,NO:14127}    Non-Pharmacological Therapy  Weight loss techniques attempted:  {Weight loss methods:57530}    Pharmacological Therapy  Current Medications: ***  Adverse Effects: ***  Previous Medications: ***     Insurance coverage of weight-loss medications? {Yes/No Descriptions:37363}  Eligible for copay cards/programs? {Yes/No Descriptions:56074}  Eligible for  PAP? {Yes/No  PAP:93911}    Medication Estimated Cost Expected weight loss Patient  Risks/Cautions Notes   Wegovy (semaglutide) ~$650/month w/ savings card 10-20% {GLP1 Cautions:12781}      Zepbound (tirzepatide) $650/month   w/ savings card 10-20%     Qsymia (phentermine-topiramate) $98/month via Qsymia Engage 8-10% {Qsymia Cautions:79776} Controlled substance   Contrave (bupropion-naltrexone) $99/month   via [employer] 5-10% {Contrave Cautions:19670}    Adipex (phentermine) ~$15/month 3-5% {Adipex Cautions:63020} Controlled substance,  Short-term use only   David (OTC Orlistat) ~$60/month 3-5%  Adverse effects likely outweigh benefit.         Medication Reconciliation:  Changed:   Added:   Discontinued:     Drug Interactions  {Drug Interactions:85412}    Medication System Management  Patient's preferred pharmacy: ***  Adherence/Organization: ***  Affordability/Accessibility: ***      Objective   Allergies   Allergen Reactions    Erythromycin Nausea And Vomiting    Iodine Hives    Levofloxacin Other     Social History     Social History Narrative    Not on file      Medication Review  Current Outpatient Medications   Medication Instructions    amLODIPine (NORVASC) 5 mg, oral, Daily    buPROPion XL (WELLBUTRIN XL) 300 mg, oral, Daily    ergocalciferol (Vitamin D-2) 1.25 MG (56230 UT) capsule Take 4 times a week x12 weeks.    ergocalciferol (Vitamin D-2) 1.25 MG (05946 UT) capsule Take 3 times a week x12 weeks.    ergocalciferol (VITAMIN D-2) 50,000 Units, Once Weekly    furosemide (LASIX) 20 mg, oral, Daily, as directed    lisinopril 40 mg tablet TAKE 1 TABLET BY MOUTH ONE TIME DAILY    mecobalamin, vitamin B12, 1,000 mcg tablet,disintegrating 1 tablet under the tongue daily    metoprolol succinate XL (TOPROL-XL) 100 mg, oral, Daily    omeprazole (PRILOSEC) 40 mg, oral, 2 times daily before meals, Do not crush or chew.    PARoxetine (PAXIL) 40 mg, oral, Daily    triamcinolone (Kenalog) 0.5 % cream 2 times daily    Wegovy 0.5 mg, subcutaneous, Once Weekly      Vitals  BP Readings from Last 2  "Encounters:   02/10/25 151/84   06/20/24 (!) 170/110     BMI Readings from Last 1 Encounters:   02/10/25 40.34 kg/m²      Labs  A1C  Lab Results   Component Value Date    HGBA1C 6.3 (H) 06/20/2024    HGBA1C 6.3 (H) 01/10/2024    HGBA1C 6.1 (A) 06/22/2023     BMP  Lab Results   Component Value Date    CALCIUM 9.9 06/20/2024     06/20/2024    K 4.5 06/20/2024    CO2 25 06/20/2024     06/20/2024    BUN 26 (H) 06/20/2024    CREATININE 0.98 06/20/2024    EGFR 62 06/20/2024     LFTs  Lab Results   Component Value Date    ALT 13 06/20/2024    AST 14 06/20/2024    ALKPHOS 106 06/20/2024    BILITOT 0.5 06/20/2024     FLP  Lab Results   Component Value Date    TRIG 144 06/20/2024    CHOL 200 (H) 06/20/2024    LDLF 133 (H) 06/22/2023    LDLCALC 126 (H) 06/20/2024    HDL 45.3 06/20/2024     Urine Microalbumin  No results found for: \"MICROALBCREA\"  Weight Management  Wt Readings from Last 3 Encounters:   02/10/25 107 kg (235 lb)   06/20/24 106 kg (233 lb)   01/10/24 108 kg (238 lb)      There is no height or weight on file to calculate BMI.     Assessment/Plan   Problem List Items Addressed This Visit    None      Clinical Pharmacist follow-up: ***, Telehealth visit  Patient is *** followed in CCM. (If yes, track minutes under compass marta at each visit)    Continue all meds under the continuation of care with the referring provider and clinical pharmacy team.    Thank you,  *** (Name)  Clinical Pharmacist  *** (Phone Number)    Verbal consent to manage patient's drug therapy was obtained from {patient rights:02675}. They were informed they may decline to participate or withdraw from participation in pharmacy services at any time.   "

## 2025-02-17 NOTE — ASSESSMENT & PLAN NOTE
Current regimen includes ***. ***. Patient's current weight reported as ***. Has lost *** lbs (***% of TBW) since starting therapy plan. Due to ***, plan to ***.    Medication Changes:  CONTINUE    STOP    START    INCREASE    DECREASE      Future Considerations:      Monitoring and Education:  {Patient Education:45668}

## 2025-02-20 RX ORDER — TIRZEPATIDE 2.5 MG/.5ML
2.5 INJECTION, SOLUTION SUBCUTANEOUS WEEKLY
Qty: 2 ML | Refills: 0 | Status: SHIPPED | OUTPATIENT
Start: 2025-02-20

## 2025-02-24 ENCOUNTER — APPOINTMENT (OUTPATIENT)
Dept: PHARMACY | Facility: HOSPITAL | Age: 70
End: 2025-02-24
Payer: MEDICARE

## 2025-03-03 ENCOUNTER — APPOINTMENT (OUTPATIENT)
Dept: PHARMACY | Facility: HOSPITAL | Age: 70
End: 2025-03-03
Payer: MEDICARE

## 2025-03-03 DIAGNOSIS — E66.01 MORBID OBESITY WITH BMI OF 40.0-44.9, ADULT (MULTI): ICD-10-CM

## 2025-03-03 RX ORDER — TIRZEPATIDE 2.5 MG/.5ML
2.5 INJECTION, SOLUTION SUBCUTANEOUS WEEKLY
Qty: 2 ML | Refills: 1 | Status: SHIPPED | OUTPATIENT
Start: 2025-03-03

## 2025-03-03 NOTE — PROGRESS NOTES
Clinical Pharmacy Appointment    Patient ID: Ema Ulloa is a 69 y.o. female who presents for Weight Loss    Pt is here for Follow Up appointment.     Referring Provider: Jose Nash DO  PCP: Jose Nash DO   Last visit with PCP: 2/10/25   Next visit with PCP: 6/2/25     Patient Assistance for Mounjaro approved through 2/25/26. Will have to be renewed prior to that date to prevent lapse in coverage. Medication(s) will be received at no cost to patient from [facility].     Subjective     WEIGHT LOSS  BMI Readings from Last 3 Encounters:   02/10/25 40.34 kg/m²   06/20/24 39.99 kg/m²   01/10/24 40.85 kg/m²      Starting weight: 235 lbs. (2/17/25)  Current weight: 235 lbs.    Goal weight: 135lb    Lifestyle  Diet: 2-3 meals/day. Has tried atkins diet, eats a lot of fast food and snacks on candy sometimes.   Drinks: social drinking  Has worked with nutritionist/dietician? No  Has worked with weight management? No  Exercise: does not exercise    Other Potential Contributing Factors  Alcohol use: socially drink  Tobacco use: No  Other drug use: No  Medications that may cause weight gain: antipsychotics (pt feels she's gained weight on antidepressant)  Mental health: No current concerns  Eating Disorders: Denies Hx of any eating disorder  Comorbidities: Comorbidities: back pain, depressed mood, high cholesterol, last Hgb A1c: 6.3%, and hypertension controlled with oral meds    Pertinent PMH Review:  PMH of Pancreatitis: No  PMH of Retinopathy: No  PMH of MTC: No  PMH of MEN2: No    Non-Pharmacological Therapy  Weight loss techniques attempted:  Self-directed dieting: atkins diet    Pharmacological Therapy  Current Medications: none      Drug Interactions  No relevant drug interactions were noted.          Objective   Allergies   Allergen Reactions    Erythromycin Nausea And Vomiting    Iodine Hives    Levofloxacin Other     Social History     Social History Narrative    Not on file     "  Medication Review  Current Outpatient Medications   Medication Instructions    amLODIPine (NORVASC) 5 mg, oral, Daily    buPROPion XL (WELLBUTRIN XL) 300 mg, oral, Daily    ergocalciferol (Vitamin D-2) 1.25 MG (55821 UT) capsule Take 4 times a week x12 weeks.    ergocalciferol (Vitamin D-2) 1.25 MG (47168 UT) capsule Take 3 times a week x12 weeks.    ergocalciferol (VITAMIN D-2) 50,000 Units, Once Weekly    furosemide (LASIX) 20 mg, oral, Daily, as directed    lisinopril 40 mg tablet TAKE 1 TABLET BY MOUTH ONE TIME DAILY    mecobalamin, vitamin B12, 1,000 mcg tablet,disintegrating 1 tablet under the tongue daily    metoprolol succinate XL (TOPROL-XL) 100 mg, oral, Daily    Mounjaro 2.5 mg, subcutaneous, Weekly    omeprazole (PRILOSEC) 40 mg, oral, 2 times daily before meals, Do not crush or chew.    PARoxetine (PAXIL) 40 mg, oral, Daily    triamcinolone (Kenalog) 0.5 % cream 2 times daily    Wegovy 0.5 mg, subcutaneous, Once Weekly      Vitals  BP Readings from Last 2 Encounters:   02/10/25 151/84   06/20/24 (!) 170/110     BMI Readings from Last 1 Encounters:   02/10/25 40.34 kg/m²      Labs  A1C  Lab Results   Component Value Date    HGBA1C 6.3 (H) 06/20/2024    HGBA1C 6.3 (H) 01/10/2024    HGBA1C 6.1 (A) 06/22/2023     BMP  Lab Results   Component Value Date    CALCIUM 9.9 06/20/2024     06/20/2024    K 4.5 06/20/2024    CO2 25 06/20/2024     06/20/2024    BUN 26 (H) 06/20/2024    CREATININE 0.98 06/20/2024    EGFR 62 06/20/2024     LFTs  Lab Results   Component Value Date    ALT 13 06/20/2024    AST 14 06/20/2024    ALKPHOS 106 06/20/2024    BILITOT 0.5 06/20/2024     FLP  Lab Results   Component Value Date    TRIG 144 06/20/2024    CHOL 200 (H) 06/20/2024    LDLF 133 (H) 06/22/2023    LDLCALC 126 (H) 06/20/2024    HDL 45.3 06/20/2024     Urine Microalbumin  No results found for: \"MICROALBCREA\"  Weight Management  Wt Readings from Last 3 Encounters:   02/10/25 107 kg (235 lb)   06/20/24 106 kg (233 " lb)   01/10/24 108 kg (238 lb)      There is no height or weight on file to calculate BMI.     Assessment/Plan   Problem List Items Addressed This Visit       Morbid obesity with BMI of 40.0-44.9, adult (Multi)     Patient's current weight reported as 235lbs. Due to patient being out of town, plan to start Mounjaro in April when patient returns to [place].    Medication Changes:  START  Mounjaro 2.5mg subcutaneous weekly (Send to [facility]- a 2 month supply)    Future Considerations:  Pt will have had 1st week of Mounjaro at next visit, plan for patient to be on starting dose for 2 months as she will be out of town.    Mounjaro Education:  Counseled patient on Mounjaro MOA, expectations, side effects, duration of therapy, administration, and monitoring parameters.  Counseled patient on the benefits of GLP-1ra, such as cardiovascular risk reduction, glycemic control, and weight loss potential.  Provided detailed dosing and administration counseling to ensure proper technique.   Reviewed Mounjaro titration schedule, starting with 2.5 mg once weekly to 5 mg, 7.5mg, 10mg, 12.5mg and if tolerated 15 mg.  Reviewed storage requirements of Mounjaro when not in use, and when to administer the medication if a dose is missed.  Discussed risks of GLP1ra including risk of pancreatitis, MTC and worsening of DR  Advised patient that they may experience improved satiety after meals and portion sizes of meals may be reduced as doses of Mounjaro increase.          Relevant Medications    tirzepatide (Mounjaro) 2.5 mg/0.5 mL pen injector    Other Relevant Orders    Referral to Clinical Pharmacy             Clinical Pharmacist follow-up: 4/14 @2:20pm, Telehealth visit    Continue all meds under the continuation of care with the referring provider and clinical pharmacy team.    Christi Gayle, PharmD  [phone]    Verbal consent to manage patient's drug therapy was obtained from the patient. They were informed they may decline to participate or  withdraw from participation in pharmacy services at any time.

## 2025-03-03 NOTE — ASSESSMENT & PLAN NOTE
Patient's current weight reported as 235lbs. Due to patient being out of town, plan to start Mounjaro in April when patient returns to [place].    Medication Changes:  START  Mounjaro 2.5mg subcutaneous weekly (Send to [facility]- a 2 month supply)    Future Considerations:  Pt will have had 1st week of Mounjaro at next visit, plan for patient to be on starting dose for 2 months as she will be out of town.    Mounjaro Education:  Counseled patient on Mounjaro MOA, expectations, side effects, duration of therapy, administration, and monitoring parameters.  Counseled patient on the benefits of GLP-1ra, such as cardiovascular risk reduction, glycemic control, and weight loss potential.  Provided detailed dosing and administration counseling to ensure proper technique.   Reviewed Mounjaro titration schedule, starting with 2.5 mg once weekly to 5 mg, 7.5mg, 10mg, 12.5mg and if tolerated 15 mg.  Reviewed storage requirements of Mounjaro when not in use, and when to administer the medication if a dose is missed.  Discussed risks of GLP1ra including risk of pancreatitis, MTC and worsening of DR  Advised patient that they may experience improved satiety after meals and portion sizes of meals may be reduced as doses of Mounjaro increase.

## 2025-04-01 PROCEDURE — RXMED WILLOW AMBULATORY MEDICATION CHARGE

## 2025-04-03 ENCOUNTER — PHARMACY VISIT (OUTPATIENT)
Dept: PHARMACY | Facility: CLINIC | Age: 70
End: 2025-04-03
Payer: COMMERCIAL

## 2025-04-14 ENCOUNTER — APPOINTMENT (OUTPATIENT)
Dept: PHARMACY | Facility: HOSPITAL | Age: 70
End: 2025-04-14
Payer: MEDICARE

## 2025-04-14 DIAGNOSIS — E66.01 MORBID OBESITY WITH BMI OF 40.0-44.9, ADULT (MULTI): ICD-10-CM

## 2025-04-14 NOTE — ASSESSMENT & PLAN NOTE
Patient's current weight reported as 227lbs. Patient will take Mounjaro 2.5mg subcutaneous weekly for 2 months as she is out of town. She is currently tolerating dose and has started seeing appetite suppression.    Medication Changes:  CONTINUE  Mounjaro 2.5mg subcutaneous weekly (Send to [facility] 2 month supply)    Future Considerations:  Consider increasing dose at next visit    Mounjaro Education:  Counseled patient on Mounjaro MOA, expectations, side effects, duration of therapy, administration, and monitoring parameters.  Counseled patient on the benefits of GLP-1ra, such as cardiovascular risk reduction, glycemic control, and weight loss potential.  Provided detailed dosing and administration counseling to ensure proper technique.   Reviewed Mounjaro titration schedule, starting with 2.5 mg once weekly to 5 mg, 7.5mg, 10mg, 12.5mg and if tolerated 15 mg.  Reviewed storage requirements of Mounjaro when not in use, and when to administer the medication if a dose is missed.  Discussed risks of GLP1ra including risk of pancreatitis, MTC and worsening of DR  Advised patient that they may experience improved satiety after meals and portion sizes of meals may be reduced as doses of Mounjaro increase.

## 2025-04-14 NOTE — PROGRESS NOTES
Clinical Pharmacy Appointment    Patient ID: Ema Ulloa is a 70 y.o. female who presents for Weight Loss    Pt is here for Follow Up appointment.     Referring Provider: Jose Nash DO  PCP: Jose Nash DO   Last visit with PCP: 2/10/25   Next visit with PCP: 6/2/25     Patient Assistance for Mounjaro approved through 2/25/26. Will have to be renewed prior to that date to prevent lapse in coverage. Medication(s) will be received at no cost to patient from [facility].     Subjective     WEIGHT LOSS  BMI Readings from Last 3 Encounters:   02/10/25 40.34 kg/m²   06/20/24 39.99 kg/m²   01/10/24 40.85 kg/m²      Starting weight: 235 lbs. (2/17/25)  Current weight: lost 8lbs as of sat morning    Goal weight: 135lb    Lifestyle  Diet: 2-3 meals/day. Has tried atkins diet, eats a lot of fast food and snacks on candy sometimes.   Drinks: social drinking  Has worked with nutritionist/dietician? No  Has worked with weight management? No  Exercise: does not exercise    Other Potential Contributing Factors  Alcohol use: socially drink  Tobacco use: No  Other drug use: No  Medications that may cause weight gain: antipsychotics (pt feels she's gained weight on antidepressant)  Mental health: No current concerns  Eating Disorders: Denies Hx of any eating disorder  Comorbidities: Comorbidities: back pain, depressed mood, high cholesterol, last Hgb A1c: 6.3%, and hypertension controlled with oral meds    Pertinent PMH Review:  PMH of Pancreatitis: No  PMH of Retinopathy: No  PMH of MTC: No  PMH of MEN2: No    Non-Pharmacological Therapy  Weight loss techniques attempted:  Self-directed dieting: atkins diet    Pharmacological Therapy  Current Medications:   Mounjaro 2.5mg subcutaneous weekly (taking on Mondays- 2nd dose today)    Drug Interactions  No relevant drug interactions were noted.          Objective   Allergies   Allergen Reactions    Erythromycin Nausea And Vomiting    Iodine Hives     "Levofloxacin Other     Social History     Social History Narrative    Not on file      Medication Review  Current Outpatient Medications   Medication Instructions    amLODIPine (NORVASC) 5 mg, oral, Daily    buPROPion XL (WELLBUTRIN XL) 300 mg, oral, Daily    ergocalciferol (Vitamin D-2) 1.25 MG (14827 UT) capsule Take 4 times a week x12 weeks.    ergocalciferol (Vitamin D-2) 1.25 MG (10243 UT) capsule Take 3 times a week x12 weeks.    ergocalciferol (VITAMIN D-2) 50,000 Units, Once Weekly    furosemide (LASIX) 20 mg, oral, Daily, as directed    lisinopril 40 mg tablet TAKE 1 TABLET BY MOUTH ONE TIME DAILY    mecobalamin, vitamin B12, 1,000 mcg tablet,disintegrating 1 tablet under the tongue daily    metoprolol succinate XL (TOPROL-XL) 100 mg, oral, Daily    Mounjaro 2.5 mg, subcutaneous, Weekly    omeprazole (PRILOSEC) 40 mg, oral, 2 times daily before meals, Do not crush or chew.    PARoxetine (PAXIL) 40 mg, oral, Daily    triamcinolone (Kenalog) 0.5 % cream 2 times daily    Wegovy 0.5 mg, subcutaneous, Once Weekly      Vitals  BP Readings from Last 2 Encounters:   02/10/25 151/84   06/20/24 (!) 170/110     BMI Readings from Last 1 Encounters:   02/10/25 40.34 kg/m²      Labs  A1C  Lab Results   Component Value Date    HGBA1C 6.3 (H) 06/20/2024    HGBA1C 6.3 (H) 01/10/2024    HGBA1C 6.1 (A) 06/22/2023     BMP  Lab Results   Component Value Date    CALCIUM 9.9 06/20/2024     06/20/2024    K 4.5 06/20/2024    CO2 25 06/20/2024     06/20/2024    BUN 26 (H) 06/20/2024    CREATININE 0.98 06/20/2024    EGFR 62 06/20/2024     LFTs  Lab Results   Component Value Date    ALT 13 06/20/2024    AST 14 06/20/2024    ALKPHOS 106 06/20/2024    BILITOT 0.5 06/20/2024     FLP  Lab Results   Component Value Date    TRIG 144 06/20/2024    CHOL 200 (H) 06/20/2024    LDLF 133 (H) 06/22/2023    LDLCALC 126 (H) 06/20/2024    HDL 45.3 06/20/2024     Urine Microalbumin  No results found for: \"MICROALBCREA\"  Weight " Management  Wt Readings from Last 3 Encounters:   02/10/25 107 kg (235 lb)   06/20/24 106 kg (233 lb)   01/10/24 108 kg (238 lb)      There is no height or weight on file to calculate BMI.     Assessment/Plan   Problem List Items Addressed This Visit       Morbid obesity with BMI of 40.0-44.9, adult (Multi)     Patient's current weight reported as 227lbs. Patient will take Mounjaro 2.5mg subcutaneous weekly for 2 months as she is out of town. She is currently tolerating dose and has started seeing appetite suppression.    Medication Changes:  CONTINUE  Mounjaro 2.5mg subcutaneous weekly (Send to [facility] a 2 month supply)    Future Considerations:  Consider increasing dose at next visit    Mounjaro Education:  Counseled patient on Mounjaro MOA, expectations, side effects, duration of therapy, administration, and monitoring parameters.  Counseled patient on the benefits of GLP-1ra, such as cardiovascular risk reduction, glycemic control, and weight loss potential.  Provided detailed dosing and administration counseling to ensure proper technique.   Reviewed Mounjaro titration schedule, starting with 2.5 mg once weekly to 5 mg, 7.5mg, 10mg, 12.5mg and if tolerated 15 mg.  Reviewed storage requirements of Mounjaro when not in use, and when to administer the medication if a dose is missed.  Discussed risks of GLP1ra including risk of pancreatitis, MTC and worsening of DR  Advised patient that they may experience improved satiety after meals and portion sizes of meals may be reduced as doses of Mounjaro increase.          Relevant Orders    Referral to Clinical Pharmacy         Clinical Pharmacist follow-up: 6/2 @2:20pm, Telehealth visit    Continue all meds under the continuation of care with the referring provider and clinical pharmacy team.    Christi Gayle, PharmD  [phone]    Verbal consent to manage patient's drug therapy was obtained from the patient. They were informed they may decline to participate or withdraw  from participation in pharmacy services at any time.

## 2025-05-01 ENCOUNTER — TELEPHONE (OUTPATIENT)
Dept: PRIMARY CARE | Facility: CLINIC | Age: 70
End: 2025-05-01
Payer: MEDICARE

## 2025-05-01 NOTE — TELEPHONE ENCOUNTER
Patient called in this afternoon asking if the doctor would be able to send in a prescription for a sedative for her.  Patient states that she is just not sleeping and not functioning right because of it.  Patient is in [place] and she is not able to come in for an appointment I did let the patient know that the doctor may not send in anything because she has not been seen since February.  Patient asked that I send the message anyway.  Patient can be reached at [phone].        Thank You

## 2025-05-02 DIAGNOSIS — G47.00 INSOMNIA, UNSPECIFIED TYPE: Primary | ICD-10-CM

## 2025-05-02 RX ORDER — TRAZODONE HYDROCHLORIDE 50 MG/1
50 TABLET ORAL NIGHTLY PRN
Qty: 30 TABLET | Refills: 0 | Status: SHIPPED | OUTPATIENT
Start: 2025-05-02 | End: 2026-05-02

## 2025-05-02 NOTE — TELEPHONE ENCOUNTER
"I spoke with patient this afternoon.  She lives with her elderly mother in [facility] and even though she has siblings she takes care of her mother by herself.  She describes her mother as a \"stubborn [place] woman\" who does not listen to her.  Recently she had to be hospitalized as she had a urosepsis and was also found to be hypoglycemic.  It is very stressful for the patient.  She has been having difficulty sleeping.  She is on Wellbutrin 300 mg a day and Paxil 40 mg a day both of which she takes in the morning.    At this point I recommended that she try trazodone 50 mg at bedtime and see if it helps her sleep.    Patient also reports she has been on the Mounjaro 2.5 mg weekly and it is helping, she has lost 15 pounds thus far.  "

## 2025-06-02 ENCOUNTER — TELEMEDICINE (OUTPATIENT)
Dept: PHARMACY | Facility: HOSPITAL | Age: 70
End: 2025-06-02

## 2025-06-02 ENCOUNTER — APPOINTMENT (OUTPATIENT)
Dept: PRIMARY CARE | Facility: CLINIC | Age: 70
End: 2025-06-02
Payer: MEDICARE

## 2025-06-02 DIAGNOSIS — E66.01 MORBID OBESITY WITH BMI OF 40.0-44.9, ADULT (MULTI): ICD-10-CM

## 2025-06-02 PROCEDURE — RXMED WILLOW AMBULATORY MEDICATION CHARGE

## 2025-06-02 RX ORDER — TIRZEPATIDE 5 MG/.5ML
5 INJECTION, SOLUTION SUBCUTANEOUS WEEKLY
Qty: 4 ML | Refills: 0 | Status: SHIPPED | OUTPATIENT
Start: 2025-06-02

## 2025-06-02 NOTE — PROGRESS NOTES
Clinical Pharmacy Appointment    Patient ID: Ema Ulloa is a 70 y.o. female who presents for Weight Loss.    Pt is here for Follow Up appointment.     Referring Provider: Jose Nash DO  PCP: Jose Nash DO  Last visit with PCP: 2/10/25    Next visit with PCP: Not currently scheduled     Subjective   Starting weight: 238 lb (2/17/25)  Current weight: 218 lb      Goal weight: 135lb     Lifestyle  Diet: 2-3 meals/day. Has tried atkins diet, eats a lot of fast food and snacks on candy sometimes.   Drinks: social drinking  Has worked with nutritionist/dietician? No  Has worked with weight management? No  Exercise: does not exercise     Other Potential Contributing Factors  Alcohol use: socially drink  Tobacco use: No  Other drug use: No  Medications that may cause weight gain: antipsychotics (pt feels she's gained weight on antidepressant)  Mental health: No current concerns  Eating Disorders: Denies Hx of any eating disorder  Comorbidities: Comorbidities: back pain, depressed mood, high cholesterol, last Hgb A1c: 6.3%, and hypertension controlled with oral meds     Pertinent PMH Review:  PMH of Pancreatitis: No  PMH of Retinopathy: No  PMH of MTC: No  PMH of MEN2: No     Non-Pharmacological Therapy  Weight loss techniques attempted:  Self-directed dieting: atkins diet       Current Medications:   Mounjaro 2.5mg subcutaneous weekly (taking on Mondays)    DISCUSSION  Patient continues to tolerate Mounjaro 2.5 mg well  Denies side effects  Endorses appetite suppression   Patient does get hungry at night and will snack on healthier options such as cottage cheese, fruits, and vegetables  Denies signs of hypoglycemia  Patient requests to try the next dose of Mounjaro  Counseled on side effects  Patient has no further questions or concerns     Objective   Allergies[1]  Social History     Social History Narrative    Not on file      Medication Review  Current Outpatient Medications   Medication Instructions  "   amLODIPine (NORVASC) 5 mg, oral, Daily    buPROPion XL (WELLBUTRIN XL) 300 mg, oral, Daily    ergocalciferol (Vitamin D-2) 1.25 MG (50973 UT) capsule Take 4 times a week x12 weeks.    ergocalciferol (Vitamin D-2) 1.25 MG (09777 UT) capsule Take 3 times a week x12 weeks.    ergocalciferol (VITAMIN D-2) 50,000 Units, Once Weekly    furosemide (LASIX) 20 mg, oral, Daily, as directed    lisinopril 40 mg tablet TAKE 1 TABLET BY MOUTH ONE TIME DAILY    mecobalamin, vitamin B12, 1,000 mcg tablet,disintegrating 1 tablet under the tongue daily    metoprolol succinate XL (TOPROL-XL) 100 mg, oral, Daily    omeprazole (PRILOSEC) 40 mg, oral, 2 times daily before meals, Do not crush or chew.    PARoxetine (PAXIL) 40 mg, oral, Daily    traZODone (DESYREL) 50 mg, oral, Nightly PRN    triamcinolone (Kenalog) 0.5 % cream 2 times daily      Vitals  BP Readings from Last 2 Encounters:   02/10/25 151/84   06/20/24 (!) 170/110     BMI Readings from Last 1 Encounters:   02/10/25 40.34 kg/m²      Labs  A1C  Lab Results   Component Value Date    HGBA1C 6.3 (H) 06/20/2024    HGBA1C 6.3 (H) 01/10/2024    HGBA1C 6.1 (A) 06/22/2023     BMP  Lab Results   Component Value Date    CALCIUM 9.9 06/20/2024     06/20/2024    K 4.5 06/20/2024    CO2 25 06/20/2024     06/20/2024    BUN 26 (H) 06/20/2024    CREATININE 0.98 06/20/2024    EGFR 62 06/20/2024     LFTs  Lab Results   Component Value Date    ALT 13 06/20/2024    AST 14 06/20/2024    ALKPHOS 106 06/20/2024    BILITOT 0.5 06/20/2024     FLP  Lab Results   Component Value Date    TRIG 144 06/20/2024    CHOL 200 (H) 06/20/2024    LDLF 133 (H) 06/22/2023    LDLCALC 126 (H) 06/20/2024    HDL 45.3 06/20/2024     Urine Microalbumin  No results found for: \"MICROALBCREA\"  Weight Management  Wt Readings from Last 3 Encounters:   02/10/25 107 kg (235 lb)   06/20/24 106 kg (233 lb)   01/10/24 108 kg (238 lb)      There is no height or weight on file to calculate BMI.     Assessment/Plan "   Problem List Items Addressed This Visit       Morbid obesity with BMI of 40.0-44.9, adult (Multi)    Patient has lost 20 lb since starting Mounjaro and is tolerating medication well with no side effects  INCREASE to Mounjaro 5 mg once weekly  Prescription sent to [facility] for delivery             Clinical Pharmacist follow-up: 6/30 to assess tolerability to Mounjaro 5 mg     Continue all meds under the continuation of care with the referring provider and clinical pharmacy team.             [1]   Allergies  Allergen Reactions    Erythromycin Nausea And Vomiting    Iodine Hives    Levofloxacin Other

## 2025-06-02 NOTE — ASSESSMENT & PLAN NOTE
Patient has lost 20 lb since starting Mounjaro and is tolerating medication well with no side effects  INCREASE to Mounjaro 5 mg once weekly  Prescription sent to [facility] for delivery

## 2025-06-06 ENCOUNTER — PHARMACY VISIT (OUTPATIENT)
Dept: PHARMACY | Facility: CLINIC | Age: 70
End: 2025-06-06
Payer: COMMERCIAL

## 2025-06-30 ENCOUNTER — APPOINTMENT (OUTPATIENT)
Dept: PHARMACY | Facility: HOSPITAL | Age: 70
End: 2025-06-30
Payer: MEDICARE

## 2025-06-30 DIAGNOSIS — E66.01 MORBID OBESITY WITH BMI OF 40.0-44.9, ADULT (MULTI): ICD-10-CM

## 2025-06-30 NOTE — ASSESSMENT & PLAN NOTE
Patient has lost 28 lb since starting Mounjaro and is tolerating medication well with no side effects  CONTINUE Mounjaro 5 mg once weekly

## 2025-06-30 NOTE — PROGRESS NOTES
Clinical Pharmacy Appointment    Patient ID: Ema Ulloa is a 70 y.o. female who presents for Obesity.    Pt is here for Follow Up appointment. At last visit, Mounjaro dose was increased to 5 mg once weekly.    Referring Provider: Jose Nash DO  PCP: Jose Nash DO  Last visit with PCP: 2/10/25        Next visit with PCP: Not currently scheduled     Subjective   Starting weight: 238 lb (2/17/25)  Previous weight: 218 lb   Current weight: 210 lb    Goal weight: 135lb     Lifestyle  Diet: 2-3 meals/day. Has tried atkins diet, eats a lot of fast food and snacks on candy sometimes.   Drinks: social drinking  Has worked with nutritionist/dietician? No  Has worked with weight management? No  Exercise: does not exercise     Other Potential Contributing Factors  Alcohol use: socially drink  Tobacco use: No  Other drug use: No  Medications that may cause weight gain: antipsychotics (pt feels she's gained weight on antidepressant)  Mental health: No current concerns  Eating Disorders: Denies Hx of any eating disorder  Comorbidities: Comorbidities: back pain, depressed mood, high cholesterol, last Hgb A1c: 6.3%, and hypertension controlled with oral meds     Pertinent PMH Review:  PMH of Pancreatitis: No  PMH of Retinopathy: No  PMH of MTC: No  PMH of MEN2: No     Non-Pharmacological Therapy  Weight loss techniques attempted:  Self-directed dieting: atkins diet     Current Medications:   Mounjaro 5 mg subcutaneous weekly (taking on Mondays)     DISCUSSION  Patient has been tolerating Mounjaro 5 mg well  Denies GI side effects  Dose not feel that appetite is as suppressed   Patient has not been as active lately due to the heat  Denies signs of hypoglycemia  Patient has no further questions or concerns     Objective   Allergies[1]  Social History     Social History Narrative    Not on file      Medication Review  Current Outpatient Medications   Medication Instructions    amLODIPine (NORVASC) 5 mg, oral, Daily  "   buPROPion XL (WELLBUTRIN XL) 300 mg, oral, Daily    ergocalciferol (Vitamin D-2) 1.25 MG (45818 UT) capsule Take 4 times a week x12 weeks.    ergocalciferol (Vitamin D-2) 1.25 MG (13257 UT) capsule Take 3 times a week x12 weeks.    ergocalciferol (VITAMIN D-2) 50,000 Units, Once Weekly    furosemide (LASIX) 20 mg, oral, Daily, as directed    lisinopril 40 mg tablet TAKE 1 TABLET BY MOUTH ONE TIME DAILY    mecobalamin, vitamin B12, 1,000 mcg tablet,disintegrating 1 tablet under the tongue daily    metoprolol succinate XL (TOPROL-XL) 100 mg, oral, Daily    Mounjaro 5 mg, subcutaneous, Weekly    omeprazole (PRILOSEC) 40 mg, oral, 2 times daily before meals, Do not crush or chew.    PARoxetine (PAXIL) 40 mg, oral, Daily    traZODone (DESYREL) 50 mg, oral, Nightly PRN    triamcinolone (Kenalog) 0.5 % cream 2 times daily      Vitals  BP Readings from Last 2 Encounters:   02/10/25 151/84   06/20/24 (!) 170/110     BMI Readings from Last 1 Encounters:   02/10/25 40.34 kg/m²      Labs  A1C  Lab Results   Component Value Date    HGBA1C 6.3 (H) 06/20/2024    HGBA1C 6.3 (H) 01/10/2024    HGBA1C 6.1 (A) 06/22/2023     BMP  Lab Results   Component Value Date    CALCIUM 9.9 06/20/2024     06/20/2024    K 4.5 06/20/2024    CO2 25 06/20/2024     06/20/2024    BUN 26 (H) 06/20/2024    CREATININE 0.98 06/20/2024    EGFR 62 06/20/2024     LFTs  Lab Results   Component Value Date    ALT 13 06/20/2024    AST 14 06/20/2024    ALKPHOS 106 06/20/2024    BILITOT 0.5 06/20/2024     FLP  Lab Results   Component Value Date    TRIG 144 06/20/2024    CHOL 200 (H) 06/20/2024    LDLF 133 (H) 06/22/2023    LDLCALC 126 (H) 06/20/2024    HDL 45.3 06/20/2024     Urine Microalbumin  No results found for: \"MICROALBCREA\"  Weight Management  Wt Readings from Last 3 Encounters:   02/10/25 107 kg (235 lb)   06/20/24 106 kg (233 lb)   01/10/24 108 kg (238 lb)      There is no height or weight on file to calculate BMI.     Assessment/Plan "   Problem List Items Addressed This Visit       Morbid obesity with BMI of 40.0-44.9, adult (Multi)    Patient has lost 28 lb since starting Mounjaro and is tolerating medication well with no side effects  CONTINUE Mounjaro 5 mg once weekly               Clinical Pharmacist follow-up: 7/28    Continue all meds under the continuation of care with the referring provider and clinical pharmacy team.           [1]   Allergies  Allergen Reactions    Erythromycin Nausea And Vomiting    Iodine Hives    Levofloxacin Other

## 2025-07-17 DIAGNOSIS — E66.01 MORBID OBESITY WITH BMI OF 40.0-44.9, ADULT (MULTI): ICD-10-CM

## 2025-07-18 RX ORDER — TIRZEPATIDE 5 MG/.5ML
5 INJECTION, SOLUTION SUBCUTANEOUS WEEKLY
Qty: 4 ML | Refills: 0 | OUTPATIENT
Start: 2025-07-18

## 2025-07-21 ENCOUNTER — TELEPHONE (OUTPATIENT)
Dept: PRIMARY CARE | Facility: CLINIC | Age: 70
End: 2025-07-21
Payer: MEDICARE

## 2025-07-21 DIAGNOSIS — F32.A DEPRESSION, UNSPECIFIED DEPRESSION TYPE: ICD-10-CM

## 2025-07-21 DIAGNOSIS — I10 HYPERTENSION, UNSPECIFIED TYPE: ICD-10-CM

## 2025-07-21 RX ORDER — METOPROLOL SUCCINATE 100 MG/1
100 TABLET, EXTENDED RELEASE ORAL DAILY
Qty: 90 TABLET | Refills: 0 | Status: SHIPPED | OUTPATIENT
Start: 2025-07-21 | End: 2026-01-17

## 2025-07-21 RX ORDER — PAROXETINE 40 MG/1
40 TABLET, FILM COATED ORAL DAILY
Qty: 90 TABLET | Refills: 0 | Status: SHIPPED | OUTPATIENT
Start: 2025-07-21 | End: 2026-01-17

## 2025-07-21 RX ORDER — BUPROPION HYDROCHLORIDE 300 MG/1
300 TABLET ORAL DAILY
Qty: 90 TABLET | Refills: 0 | Status: SHIPPED | OUTPATIENT
Start: 2025-07-21 | End: 2026-01-17

## 2025-07-21 RX ORDER — LISINOPRIL 40 MG/1
TABLET ORAL
Qty: 90 TABLET | Refills: 0 | Status: SHIPPED | OUTPATIENT
Start: 2025-07-21

## 2025-07-21 NOTE — TELEPHONE ENCOUNTER
Rx Refill Request Telephone Encounter    Name:  Ema Ulloa  :  [id number]  Medication Name:    PARoxetine (Paxil) 40 mg tablet   metoprolol succinate XL (Toprol-XL) 100 mg 24 hr tablet   lisinopril 40 mg tablet   buPROPion XL (Wellbutrin XL) 300 mg 24 hr tablet       Specific Pharmacy location:    [id number] [address] [facility] [address] Phone: [phone]   Fax: [phone]        Date of last appointment:  02/10/25  Date of next appointment:    Best number to reach patient:  [phone]        Thank You

## 2025-07-28 ENCOUNTER — APPOINTMENT (OUTPATIENT)
Dept: PHARMACY | Facility: HOSPITAL | Age: 70
End: 2025-07-28
Payer: MEDICARE

## 2025-07-28 DIAGNOSIS — E66.01 MORBID OBESITY WITH BMI OF 40.0-44.9, ADULT (MULTI): Primary | ICD-10-CM

## 2025-07-28 PROCEDURE — RXMED WILLOW AMBULATORY MEDICATION CHARGE

## 2025-07-28 RX ORDER — TIRZEPATIDE 7.5 MG/.5ML
7.5 INJECTION, SOLUTION SUBCUTANEOUS WEEKLY
Qty: 4 ML | Refills: 1 | Status: SHIPPED | OUTPATIENT
Start: 2025-07-28

## 2025-07-28 NOTE — PROGRESS NOTES
Clinical Pharmacy Appointment    Patient ID: Ema Ulloa is a 70 y.o. female who presents for Obesity.    Pt is here for Follow Up appointment.     Referring Provider: Jose Nash DO  PCP: Jose Nash DO  Last visit with PCP: 2/10/25        Next visit with PCP: Not currently scheduled     Subjective   Starting weight: 238 lb (2/17/25)  Previous weight: 210 lb   Current weight: 215 lb    Goal weight: 135lb     Lifestyle  Diet: 2-3 meals/day. Has tried atkins diet, eats a lot of fast food and snacks on candy sometimes.   Drinks: social drinking  Has worked with nutritionist/dietician? No  Has worked with weight management? No  Exercise: does not exercise     Other Potential Contributing Factors  Alcohol use: socially drink  Tobacco use: No  Other drug use: No  Medications that may cause weight gain: antipsychotics (pt feels she's gained weight on antidepressant)  Mental health: No current concerns  Eating Disorders: Denies Hx of any eating disorder  Comorbidities: Comorbidities: back pain, depressed mood, high cholesterol, last Hgb A1c: 6.3%, and hypertension controlled with oral meds     Pertinent PMH Review:  PMH of Pancreatitis: No  PMH of Retinopathy: No  PMH of MTC: No  PMH of MEN2: No     Non-Pharmacological Therapy  Weight loss techniques attempted:  Self-directed dieting: atkins diet     Current Medications:   Mounjaro 5 mg subcutaneous weekly (taking on Mondays)     DISCUSSION  Patient continues to tolerate Mounjaro 5 mg well  Denies GI side effects  Patient has noticed the appetite is growing and he is feeling hungry at night   Patient tries to control her portions and to only have sweets/snack in moderation   Patient would like to try next dose of Mounjaro  Counseled on side effects  Patient has no further questions or concerns     Objective   Allergies[1]  Social History     Social History Narrative    Not on file      Medication Review  Current Outpatient Medications   Medication  "Instructions    amLODIPine (NORVASC) 5 mg, oral, Daily    buPROPion XL (WELLBUTRIN XL) 300 mg, oral, Daily    ergocalciferol (Vitamin D-2) 1.25 MG (32293 UT) capsule Take 4 times a week x12 weeks.    ergocalciferol (Vitamin D-2) 1.25 MG (80524 UT) capsule Take 3 times a week x12 weeks.    ergocalciferol (VITAMIN D-2) 50,000 Units, Once Weekly    furosemide (LASIX) 20 mg, oral, Daily, as directed    lisinopril 40 mg tablet TAKE 1 TABLET BY MOUTH ONE TIME DAILY    mecobalamin, vitamin B12, 1,000 mcg tablet,disintegrating 1 tablet under the tongue daily    metoprolol succinate XL (TOPROL-XL) 100 mg, oral, Daily    Mounjaro 5 mg, subcutaneous, Weekly    omeprazole (PRILOSEC) 40 mg, oral, 2 times daily before meals, Do not crush or chew.    PARoxetine (PAXIL) 40 mg, oral, Daily    traZODone (DESYREL) 50 mg, oral, Nightly PRN    triamcinolone (Kenalog) 0.5 % cream 2 times daily      Vitals  BP Readings from Last 2 Encounters:   02/10/25 151/84   06/20/24 (!) 170/110     BMI Readings from Last 1 Encounters:   02/10/25 40.34 kg/m²      Labs  A1C  Lab Results   Component Value Date    HGBA1C 6.3 (H) 06/20/2024    HGBA1C 6.3 (H) 01/10/2024    HGBA1C 6.1 (A) 06/22/2023     BMP  Lab Results   Component Value Date    CALCIUM 9.9 06/20/2024     06/20/2024    K 4.5 06/20/2024    CO2 25 06/20/2024     06/20/2024    BUN 26 (H) 06/20/2024    CREATININE 0.98 06/20/2024    EGFR 62 06/20/2024     LFTs  Lab Results   Component Value Date    ALT 13 06/20/2024    AST 14 06/20/2024    ALKPHOS 106 06/20/2024    BILITOT 0.5 06/20/2024     FLP  Lab Results   Component Value Date    TRIG 144 06/20/2024    CHOL 200 (H) 06/20/2024    LDLF 133 (H) 06/22/2023    LDLCALC 126 (H) 06/20/2024    HDL 45.3 06/20/2024     Urine Microalbumin  No results found for: \"MICROALBCREA\"  Weight Management  Wt Readings from Last 3 Encounters:   02/10/25 107 kg (235 lb)   06/20/24 106 kg (233 lb)   01/10/24 108 kg (238 lb)      There is no height or " weight on file to calculate BMI.     Assessment/Plan   Patient has lost 33 lb since starting Mounjaro and is tolerating medication well with no side effects  INCREASE to Mounjaro 7.5 mg once weekly         Clinical Pharmacist follow-up: 8/19    Continue all meds under the continuation of care with the referring provider and clinical pharmacy team.           [1]   Allergies  Allergen Reactions    Erythromycin Nausea And Vomiting    Iodine Hives    Levofloxacin Other

## 2025-07-30 ENCOUNTER — PHARMACY VISIT (OUTPATIENT)
Dept: PHARMACY | Facility: CLINIC | Age: 70
End: 2025-07-30
Payer: COMMERCIAL

## 2025-08-19 ENCOUNTER — APPOINTMENT (OUTPATIENT)
Dept: PHARMACY | Facility: HOSPITAL | Age: 70
End: 2025-08-19
Payer: MEDICARE

## 2025-08-19 DIAGNOSIS — E66.01 MORBID OBESITY WITH BMI OF 40.0-44.9, ADULT (MULTI): ICD-10-CM

## 2025-09-30 ENCOUNTER — APPOINTMENT (OUTPATIENT)
Dept: PHARMACY | Facility: HOSPITAL | Age: 70
End: 2025-09-30
Payer: MEDICARE